# Patient Record
Sex: FEMALE | Race: WHITE | Employment: OTHER | URBAN - METROPOLITAN AREA
[De-identification: names, ages, dates, MRNs, and addresses within clinical notes are randomized per-mention and may not be internally consistent; named-entity substitution may affect disease eponyms.]

---

## 2021-02-24 ENCOUNTER — ANESTHESIA EVENT (OUTPATIENT)
Dept: SURGERY | Age: 61
End: 2021-02-24
Payer: COMMERCIAL

## 2021-02-25 ENCOUNTER — HOSPITAL ENCOUNTER (OUTPATIENT)
Age: 61
Setting detail: OUTPATIENT SURGERY
Discharge: HOME | End: 2021-02-25
Attending: ORTHOPAEDIC SURGERY | Admitting: ORTHOPAEDIC SURGERY
Payer: COMMERCIAL

## 2021-02-25 ENCOUNTER — ANESTHESIA (OUTPATIENT)
Dept: SURGERY | Age: 61
End: 2021-02-25
Payer: COMMERCIAL

## 2021-02-25 VITALS
DIASTOLIC BLOOD PRESSURE: 82 MMHG | TEMPERATURE: 98.2 F | HEART RATE: 63 BPM | SYSTOLIC BLOOD PRESSURE: 132 MMHG | RESPIRATION RATE: 16 BRPM | WEIGHT: 152 LBS | BODY MASS INDEX: 28.72 KG/M2 | OXYGEN SATURATION: 96 %

## 2021-02-25 PROCEDURE — 76210000063 HC OR PH I REC FIRST 0.5 HR: Performed by: ORTHOPAEDIC SURGERY

## 2021-02-25 PROCEDURE — 76210000020 HC REC RM PH II FIRST 0.5 HR: Performed by: ORTHOPAEDIC SURGERY

## 2021-02-25 PROCEDURE — 2709999900 HC NON-CHARGEABLE SUPPLY: Performed by: ORTHOPAEDIC SURGERY

## 2021-02-25 PROCEDURE — 77030002888 HC SUT CHRMC J&J -A: Performed by: ORTHOPAEDIC SURGERY

## 2021-02-25 PROCEDURE — 74011250636 HC RX REV CODE- 250/636: Performed by: ANESTHESIOLOGY

## 2021-02-25 PROCEDURE — 74011250636 HC RX REV CODE- 250/636: Performed by: NURSE ANESTHETIST, CERTIFIED REGISTERED

## 2021-02-25 PROCEDURE — 77030002916 HC SUT ETHLN J&J -A: Performed by: ORTHOPAEDIC SURGERY

## 2021-02-25 PROCEDURE — 76010000154 HC OR TIME FIRST 0.5 HR: Performed by: ORTHOPAEDIC SURGERY

## 2021-02-25 PROCEDURE — 74011000250 HC RX REV CODE- 250: Performed by: NURSE ANESTHETIST, CERTIFIED REGISTERED

## 2021-02-25 PROCEDURE — 74011250636 HC RX REV CODE- 250/636: Performed by: NURSE PRACTITIONER

## 2021-02-25 PROCEDURE — 76060000031 HC ANESTHESIA FIRST 0.5 HR: Performed by: ORTHOPAEDIC SURGERY

## 2021-02-25 PROCEDURE — 77030010507 HC ADH SKN DERMBND J&J -B: Performed by: ORTHOPAEDIC SURGERY

## 2021-02-25 PROCEDURE — 74011000250 HC RX REV CODE- 250: Performed by: ORTHOPAEDIC SURGERY

## 2021-02-25 RX ORDER — MIDAZOLAM HYDROCHLORIDE 1 MG/ML
2 INJECTION, SOLUTION INTRAMUSCULAR; INTRAVENOUS ONCE
Status: DISCONTINUED | OUTPATIENT
Start: 2021-02-25 | End: 2021-02-25 | Stop reason: HOSPADM

## 2021-02-25 RX ORDER — SODIUM CHLORIDE 0.9 % (FLUSH) 0.9 %
5-40 SYRINGE (ML) INJECTION EVERY 8 HOURS
Status: DISCONTINUED | OUTPATIENT
Start: 2021-02-25 | End: 2021-02-25 | Stop reason: HOSPADM

## 2021-02-25 RX ORDER — CEFAZOLIN SODIUM/WATER 2 G/20 ML
2 SYRINGE (ML) INTRAVENOUS ONCE
Status: COMPLETED | OUTPATIENT
Start: 2021-02-25 | End: 2021-02-25

## 2021-02-25 RX ORDER — LIDOCAINE HYDROCHLORIDE 10 MG/ML
INJECTION INFILTRATION; PERINEURAL AS NEEDED
Status: DISCONTINUED | OUTPATIENT
Start: 2021-02-25 | End: 2021-02-25 | Stop reason: HOSPADM

## 2021-02-25 RX ORDER — OXYCODONE HYDROCHLORIDE 5 MG/1
5 TABLET ORAL
Status: DISCONTINUED | OUTPATIENT
Start: 2021-02-25 | End: 2021-02-25 | Stop reason: HOSPADM

## 2021-02-25 RX ORDER — LIDOCAINE HYDROCHLORIDE 10 MG/ML
0.1 INJECTION INFILTRATION; PERINEURAL AS NEEDED
Status: DISCONTINUED | OUTPATIENT
Start: 2021-02-25 | End: 2021-02-25 | Stop reason: HOSPADM

## 2021-02-25 RX ORDER — PROPOFOL 10 MG/ML
INJECTION, EMULSION INTRAVENOUS AS NEEDED
Status: DISCONTINUED | OUTPATIENT
Start: 2021-02-25 | End: 2021-02-25 | Stop reason: HOSPADM

## 2021-02-25 RX ORDER — LIDOCAINE HYDROCHLORIDE 20 MG/ML
INJECTION, SOLUTION EPIDURAL; INFILTRATION; INTRACAUDAL; PERINEURAL AS NEEDED
Status: DISCONTINUED | OUTPATIENT
Start: 2021-02-25 | End: 2021-02-25 | Stop reason: HOSPADM

## 2021-02-25 RX ORDER — FENTANYL CITRATE 50 UG/ML
25 INJECTION, SOLUTION INTRAMUSCULAR; INTRAVENOUS ONCE
Status: DISCONTINUED | OUTPATIENT
Start: 2021-02-25 | End: 2021-02-25 | Stop reason: HOSPADM

## 2021-02-25 RX ORDER — SODIUM CHLORIDE 9 MG/ML
50 INJECTION, SOLUTION INTRAVENOUS CONTINUOUS
Status: DISCONTINUED | OUTPATIENT
Start: 2021-02-25 | End: 2021-02-25 | Stop reason: HOSPADM

## 2021-02-25 RX ORDER — SODIUM CHLORIDE 0.9 % (FLUSH) 0.9 %
5-40 SYRINGE (ML) INJECTION AS NEEDED
Status: DISCONTINUED | OUTPATIENT
Start: 2021-02-25 | End: 2021-02-25 | Stop reason: HOSPADM

## 2021-02-25 RX ORDER — MIDAZOLAM HYDROCHLORIDE 1 MG/ML
2 INJECTION, SOLUTION INTRAMUSCULAR; INTRAVENOUS
Status: DISCONTINUED | OUTPATIENT
Start: 2021-02-25 | End: 2021-02-25 | Stop reason: HOSPADM

## 2021-02-25 RX ORDER — SODIUM CHLORIDE, SODIUM LACTATE, POTASSIUM CHLORIDE, CALCIUM CHLORIDE 600; 310; 30; 20 MG/100ML; MG/100ML; MG/100ML; MG/100ML
100 INJECTION, SOLUTION INTRAVENOUS CONTINUOUS
Status: DISCONTINUED | OUTPATIENT
Start: 2021-02-25 | End: 2021-02-25 | Stop reason: HOSPADM

## 2021-02-25 RX ORDER — PROPOFOL 10 MG/ML
INJECTION, EMULSION INTRAVENOUS
Status: DISCONTINUED | OUTPATIENT
Start: 2021-02-25 | End: 2021-02-25 | Stop reason: HOSPADM

## 2021-02-25 RX ORDER — BUPIVACAINE HYDROCHLORIDE 2.5 MG/ML
INJECTION, SOLUTION EPIDURAL; INFILTRATION; INTRACAUDAL AS NEEDED
Status: DISCONTINUED | OUTPATIENT
Start: 2021-02-25 | End: 2021-02-25 | Stop reason: HOSPADM

## 2021-02-25 RX ORDER — OXYCODONE AND ACETAMINOPHEN 5; 325 MG/1; MG/1
1 TABLET ORAL AS NEEDED
Status: DISCONTINUED | OUTPATIENT
Start: 2021-02-25 | End: 2021-02-25 | Stop reason: HOSPADM

## 2021-02-25 RX ORDER — SODIUM CHLORIDE, SODIUM LACTATE, POTASSIUM CHLORIDE, CALCIUM CHLORIDE 600; 310; 30; 20 MG/100ML; MG/100ML; MG/100ML; MG/100ML
75 INJECTION, SOLUTION INTRAVENOUS CONTINUOUS
Status: DISCONTINUED | OUTPATIENT
Start: 2021-02-25 | End: 2021-02-25 | Stop reason: HOSPADM

## 2021-02-25 RX ORDER — HYDROMORPHONE HYDROCHLORIDE 2 MG/ML
0.5 INJECTION, SOLUTION INTRAMUSCULAR; INTRAVENOUS; SUBCUTANEOUS
Status: DISCONTINUED | OUTPATIENT
Start: 2021-02-25 | End: 2021-02-25 | Stop reason: HOSPADM

## 2021-02-25 RX ORDER — DIPHENHYDRAMINE HYDROCHLORIDE 50 MG/ML
12.5 INJECTION, SOLUTION INTRAMUSCULAR; INTRAVENOUS
Status: DISCONTINUED | OUTPATIENT
Start: 2021-02-25 | End: 2021-02-25 | Stop reason: HOSPADM

## 2021-02-25 RX ORDER — FAMOTIDINE 20 MG/1
20 TABLET, FILM COATED ORAL ONCE
Status: DISCONTINUED | OUTPATIENT
Start: 2021-02-25 | End: 2021-02-25 | Stop reason: HOSPADM

## 2021-02-25 RX ORDER — MIDAZOLAM HYDROCHLORIDE 1 MG/ML
INJECTION, SOLUTION INTRAMUSCULAR; INTRAVENOUS AS NEEDED
Status: DISCONTINUED | OUTPATIENT
Start: 2021-02-25 | End: 2021-02-25 | Stop reason: HOSPADM

## 2021-02-25 RX ADMIN — CEFAZOLIN 2 G: 1 INJECTION, POWDER, FOR SOLUTION INTRAVENOUS at 09:14

## 2021-02-25 RX ADMIN — LIDOCAINE HYDROCHLORIDE 20 MG: 20 INJECTION, SOLUTION EPIDURAL; INFILTRATION; INTRACAUDAL; PERINEURAL at 09:14

## 2021-02-25 RX ADMIN — PROPOFOL 200 MCG/KG/MIN: 10 INJECTION, EMULSION INTRAVENOUS at 09:14

## 2021-02-25 RX ADMIN — MIDAZOLAM 2 MG: 1 INJECTION INTRAMUSCULAR; INTRAVENOUS at 09:13

## 2021-02-25 RX ADMIN — PROPOFOL 40 MG: 10 INJECTION, EMULSION INTRAVENOUS at 09:14

## 2021-02-25 RX ADMIN — SODIUM CHLORIDE, SODIUM LACTATE, POTASSIUM CHLORIDE, AND CALCIUM CHLORIDE 75 ML/HR: 600; 310; 30; 20 INJECTION, SOLUTION INTRAVENOUS at 08:23

## 2021-02-25 NOTE — DISCHARGE INSTRUCTIONS
Postoperative  Instructions:      Weightbearing or Lifting:  Limit  weight  lifting  to  less  than  1  pound  (coffee  mug)  for  the  first  2  weeks  after  surgery. Dressing  instructions:    Keep  your  dressing  and/or  splint  clean  and  dry  at  all  times. You  can  remove  your  dressing  on  post-operative  day  #5  and  change  with  a  dry/sterile  dressing  or  Band-Aids  as  needed  thereafter. Showering  Instructions:  May  shower  But keep surgical dressing clean and dry until removed as explained above. After dressing is removed, you may allow soapy water to run through the incision during showers but do not scrub. After each shower, pat dry and apply a dry dressing. Do  not  soak  your  Incision in still water or bathtub  for  3  weeks  after  surgery. If  the  incision  gets  wet otherwise,  pat  dry  and  do  not  scrub  the  incision. Do  not  apply  cream  or  lotion  to  incision      Pain  Control:  - You  have  been  given  a  prescription  to  be  taken  as  directed  for  post-operative  pain  control. In  addition,  elevate  the  operative  extremity  above  the  heart  at  all  times  to  prevent  swelling  and  throbbing  pain. - If you develop constipation while taking narcotic pain medications (Norco, Hydrocodone, Percocet, Oxycodone, Dilaudid, Hydromorphone) take  over-the-counter  Colace,  100mg  by  mouth  twice  a  Day. - Nausea  is  a  common  side  effect  of  many  pain  medications. You  will  want  to  eat something  before  taking  your  pain  medicine  to  help  prevent  Nausea. - If  you  are  taking  a  prescription  pain  medication  that  contains  acetaminophen,  we  recommend  that  you  do  not  take  additional  over  the  counter  acetaminophen  (Tylenol®).       Other  pain  relieving  options:   - Using  a  cold  pack  to  ice  the  affected  area  a  few  times  a  day  (15  to  20  minutes  at  a  time)  can  help  to  relieve pain,  reduce  swelling  and  bruising.      - Elevation  of  the  affected  area  can  also  help  to  reduce  pain  and  swelling. Did  you  receive  a  nerve  Block? A  nerve  block  can  provide  pain  relief  for  one  hour  to  two  days  after  your  surgery. As  long  as  the  nerve  block  is  working,  you  will  experience  little  or  no  sensation  in  the  area  the  surgeon  operated  on. As  the  nerve  block  wears  off,  you  will  begin  to  experience  pain  or  discomfort. It  is  very  important  that  you  begin  taking  your  prescribed  pain  medication  before  the  nerve  block  fully  wears  off. The first sign that the nerve block is wearing off is tingling in your fingers. Treating  your  pain  at  the  first  sign  of  the  block  wearing  off  will  ensure  your  pain  is  better  controlled  and  more  tolerable  when  full-sensation  returns. Do  not  wait  until  the  pain  is  intolerable,  as  the  medicine  will  be  less  effective. It  is  better  to  treat  pain  in  advance  than  to  try  and  catch  up. General  Anesthesia or Sedation:      If  you  did  not  receive  a  nerve  block  during  your  surgery,  you  will  need  to  start  taking  your  pain  medication  shortly  after  your  surgery  and  should  continue  to  do  so  as  prescribed  by  your  surgeon. Please  call  [phone]  with any concern and ask to speak with Chelsea Hwang. Concerning problems include:      -  Excessive  redness  of  the  incisions      -  Drainage  for  more  than  2  Days after surgery or any foul smelling drainage  -  Fever  of  more  than  101.5  F      Please  call  [phone]  if  you  do  not  receive  or  are  unsure  of  your  first  follow-up  appointment. You  should  see  the  doctor  10-14  days  after  your  Surgery. Thank you for choosing me and [address] [place] [address] for your care.  I will go above and beyond to ensure you receive the best care possible. Brenda Chan MD, PhD    ACTIVITY  · As tolerated and as directed by your doctor. · Bathe or shower as directed by your doctor. DIET  · Clear liquids until no nausea or vomiting; then light diet for the first day. · Advance to regular diet on second day, unless your doctor orders otherwise. · If nausea and vomiting continues, call your doctor. PAIN  · Take pain medication as directed by your doctor. · Call your doctor if pain is NOT relieved by medication. · DO NOT take aspirin of blood thinners unless directed by your doctor. CALL YOUR DOCTOR IF   · Excessive bleeding that does not stop after holding pressure over the area  · Temperature of 101 degrees F or above  · Excessive redness, swelling or bruising, and/ or green or yellow, smelly discharge from incision    AFTER ANESTHESIA   · For the first 24 hours: DO NOT Drive, Drink alcoholic beverages, or Make important decisions. · Be aware of dizziness following anesthesia and while taking pain medication. DISCHARGE SUMMARY from Nurse    PATIENT INSTRUCTIONS:    After general anesthesia or intravenous sedation, for 24 hours or while taking prescription Narcotics:  · Limit your activities  · Do not drive and operate hazardous machinery  · Do not make important personal or business decisions  · Do  not drink alcoholic beverages  · If you have not urinated within 8 hours after discharge, please contact your surgeon on call. *  Please give a list of your current medications to your Primary Care Provider. *  Please update this list whenever your medications are discontinued, doses are      changed, or new medications (including over-the-counter products) are added. *  Please carry medication information at all times in case of emergency situations.       These are general instructions for a healthy lifestyle:    No smoking/ No tobacco products/ Avoid exposure to second hand smoke    Surgeon General's Warning:  Quitting smoking now greatly reduces serious risk to your health. Obesity, smoking, and sedentary lifestyle greatly increases your risk for illness    A healthy diet, regular physical exercise & weight monitoring are important for maintaining a healthy lifestyle    You may be retaining fluid if you have a history of heart failure or if you experience any of the following symptoms:  Weight gain of 3 pounds or more overnight or 5 pounds in a week, increased swelling in our hands or feet or shortness of breath while lying flat in bed. Please call your doctor as soon as you notice any of these symptoms; do not wait until your next office visit. Recognize signs and symptoms of STROKE:    F-face looks uneven    A-arms unable to move or move unevenly    S-speech slurred or non-existent    T-time-call 911 as soon as signs and symptoms begin-DO NOT go       Back to bed or wait to see if you get better-TIME IS BRAIN. Learning About COVID-19 and Social Distancing  What is it? Social distancing means putting space between yourself and other people. The recommended distance is 6 feet, or about 2 meters. This also means staying away from any place where people may gather, such as khanna or other public gathering places. Why is it important? Social distancing is the best way to reduce the spread of COVID-19. This virus seems to spread from person to person through droplets from coughing and sneezing. So if you keep your distance from others, you're less likely to get it or spread it. And social distancing is important for everyone, not just those who are at high risk of infection, like older people. You might have the virus but not have symptoms. You could then give the infection to someone you come into contact with. How is it done? Putting 6 feet, or about 2 meters, between you and other people is the recommended distance.  Also stay away from any place where people may gather, such as khanna or other public gathering places. So if possible:  · Work from home, and keep your kids at home.  · Don't travel if you don't have to. And avoid public transportation, ride-shares, and taxis unless you have no choice.  · Limit shopping to essentials, like food and medicines.  · Wear a cloth face cover if you have to go to a public place like the grocery store or pharmacy.  · Don't eat in restaurants. (You can still get takeout or food deliveries.)  · Avoid crowds and busy places. Follow stay-at-home orders or other directions for your area.  Where can you learn more?  Go to https://www.Myfacepage.net/[employer]  Enter A133 in the search box to learn more about \"Learning About COVID-19 and Social Distancing.\"  Current as of: December 18, 2020               Content Version: 12.7  © 1796-6341 [employer].   Care instructions adapted under license by [employer] (which disclaims liability or warranty for this information). If you have questions about a medical condition or this instruction, always ask your healthcare professional. [employer] disclaims any warranty or liability for your use of this information.

## 2021-02-25 NOTE — ANESTHESIA PREPROCEDURE EVALUATION
Relevant Problems   No relevant active problems       Anesthetic History   No history of anesthetic complications            Review of Systems / Medical History  Patient summary reviewed and pertinent labs reviewed    Pulmonary  Within defined limits              Comments: D/Cali smoking 3 years ago   Neuro/Psych             Comments: Hx of panic attacks Cardiovascular    Hypertension          Hyperlipidemia    Exercise tolerance: >4 METS     GI/Hepatic/Renal     GERD: well controlled           Endo/Other        Morbid obesity     Other Findings            Physical Exam    Airway  Mallampati: II  TM Distance: > 6 cm  Neck ROM: normal range of motion   Mouth opening: Normal     Cardiovascular    Rhythm: regular           Dental    Dentition: Caps/crowns     Pulmonary                 Abdominal  GI exam deferred       Other Findings            Anesthetic Plan    ASA: 3  Anesthesia type: total IV anesthesia          Induction: Intravenous  Anesthetic plan and risks discussed with: Patient

## 2021-02-25 NOTE — ANESTHESIA POSTPROCEDURE EVALUATION
Procedure(s):  RIGHT THUMB TRIGGER RELEASE.    total IV anesthesia    Anesthesia Post Evaluation      Multimodal analgesia: multimodal analgesia not used between 6 hours prior to anesthesia start to PACU discharge  Patient location during evaluation: PACU  Patient participation: complete - patient participated  Level of consciousness: awake  Pain management: adequate  Airway patency: patent  Anesthetic complications: no  Cardiovascular status: acceptable  Respiratory status: acceptable  Hydration status: acceptable  Post anesthesia nausea and vomiting:  none  Final Post Anesthesia Temperature Assessment:  Normothermia (36.0-37.5 degrees C)      INITIAL Post-op Vital signs:   Vitals Value Taken Time   /81 02/25/21 0953   Temp 36.8 °C (98.2 °F) 02/25/21 0939   Pulse 60 02/25/21 1001   Resp 16 02/25/21 0943   SpO2 94 % 02/25/21 1001   Vitals shown include unvalidated device data.

## 2021-02-25 NOTE — OP NOTES
Hand Surgery Operative Note      Venice Busby   56 y.o.   female      Pre-op diagnosis: Right thumb Trigger Finger   Post op diagnosis: same      Procedure: Right  thumb Trigger Finger Release, (CPT 91823)     Surgeon: Reynold Gallegos MD, PhD      Anesthesia: [employer] Block     Tourniquet time:   Total Tourniquet Time Documented:  Forearm (Right) - 9 minutes  Total: Forearm (Right) - 9 minutes        Procedure indications: Patient with catching and locking of the above mentioned finger(s) which have been recalcitrant to nonoperative measures. After Thorough discussion, the patient decided to proceed with surgical management. We discussed in detail surgical risks including scar, pain, bleeding, infection, anesthetic risks, neurovascular injury, need for further surgery,  weakness, stiffness, risk of death and potential risk of other unforseen complication. Procedure description:      Patient was placed in the supine position and after appropriate time-out and side, site and procedure confirmed. The anesthesia team provided a [employer] Block. A longitudinal incision was made along the thumb MP crease under loupe magnification. Blunt dissection was used to identify the A1 pulley as well as the neurovascular bundle on each side of the flexor tendon. Blunt retraction was used to protect the neurovascular bundles. Sharp incision was made on top of the A1 pulley and scissor dissection was used to extend the sheath incision proximally to release the palmar pulley and distally until the A2 pulley was encountered. The flexor tendons were then mobilized and not catching or clicking was identified. Wound was irrigated and hemostasis was obtained with bipolar cautery. Skin edges were infiltrated with . 25% Bupivacaine. Wound then closed with 4-0 rapide and sterile dressing applied. Disposition: To PACU with no complications and follow up per routine.   Patient is instructed to remove dressings in five days and other precautions include avoidance of heavy and repetitive lifting for 2 weeks, when an appointment for follow up and suture removal will take place.      Tommy Desai MD  02/25/21  9:32 AM

## 2021-02-25 NOTE — H&P
H&P Update:  Sheryl Rodriguez was seen and examined. History and physical has been reviewed. The patient has been examined.  There have been no significant clinical changes since the completion of the originally dated History and Nicki Ribeiro MD, PhD  Orthopaedic Surgery  02/25/21  8:17 AM

## 2021-05-12 ENCOUNTER — HOSPITAL ENCOUNTER (OUTPATIENT)
Dept: PHYSICAL THERAPY | Age: 61
Discharge: HOME | End: 2021-05-12
Payer: COMMERCIAL

## 2021-05-12 PROCEDURE — 97162 PT EVAL MOD COMPLEX 30 MIN: CPT

## 2021-05-12 PROCEDURE — 97110 THERAPEUTIC EXERCISES: CPT

## 2021-05-12 NOTE — THERAPY EVALUATION
Kelley Faust  : 1960  Primary: [facility]*  Secondary:  [facility] [place] [address], [address], [place]  [phone]   [phone]        OUTPATIENT PHYSICAL THERAPY:Initial Assessment 2021   ICD-10: Treatment Diagnosis: Lack of coordination (muscle incoordination) (R27.8), Pelvic floor dysfunction in female (M62.98), Stress incontinence (female) (male) (N39.3), Low back pain (M54.5 ), Pelvic and perineal pain (R10.2) and Myalgia (M79.1)  Precautions/Allergies:   Axid [nizatidine], Other medication, Simcor [niacin-simvastatin], Sulfa (sulfonamide antibiotics), Crestor [rosuvastatin], Erythromycin, and Shellfish containing products  TREATMENT PLAN:  Effective Dates: 2021 TO 8/10/2021 (90 days). Frequency/Duration: 1-2 time a week for 90 Day(s) MEDICAL/REFERRING DIAGNOSIS:  PFD (pelvic floor dysfunction) [M62.89]  DATE OF ONSET: chronic  REFERRING PHYSICIAN: Justine Torre MD MD Orders: Evaluate and treat  Return MD Appointment: 21     INITIAL ASSESSMENT: Kelley Faust presents with musculoskeletal limitations including pelvic floor muscle (PFM) tension, reduced PFM Range of Motion (ROM), increased tenderness to palpation of the PFM, reduced hip ROM, reduced coordination and awareness of PFM, reduced hip strength, poor diaphragm excursion and poor abdominal strength. These limitations are impairing the patient's ability to properly coordinate perineal elevation and descent for normalized PFM function, contributing to sexual dysfunction and low back pain. As a result, she is limited in her/his ability to participate in gynecological exams, intercourse and house hold tasks. PROBLEM LIST (Impacting functional limitations):  1. Decreased Strength  2. Decreased ADL/Functional Activities  3. Increased Pain  4. Decreased Activity Tolerance  5. Decreased Flexibility/Joint Mobility  6.  Decreased Skin Integrity/Hygeine  7. Decreased [place] with Home Exercise Program  8. Decreased timing coordination and awareness INTERVENTIONS PLANNED: (Treatment may consist of any combination of the following)  1. Neuromuscular re-education  2. Biofeedback as needed  3. HEP  4. Bladder retraining  5. Bladder education  6. Home Exercise Program (HEP)  7. Manual Therapy  8. Neuromuscular Re-education/Strengthening  9. Therapeutic Activites  10. Therapeutic Exercise/Strengthening     GOALS: (Goals have been discussed and agreed upon with patient.)  Short-Term Functional Goals: Time Frame: 6 weeks  1. Pt will demonstrate I with basic PFM HEP to improve awareness, coordination, and timing of PFM. 2. Pt will demonstrate ability to perform diaphragmatic breathing in multiple positions to assist in pelvic floor tension reduction. 3. Pt will verbalize understanding and demonstrate postural adjustments which facilitate lengthening and reduce overall pelvic floor muscle activity. Discharge Goals: Time Frame: 12 weeks  1. Pt will demonstrate ability to voluntarily contract the pelvic floor muscles prior to a cough or sneeze for decreased leakage during increases in intra-abdominal pressure. 2. Pt will improve outcome score by 50%. 3. Pt will be independent in a home dilator and/or graded exposure program, to be performed daily, in order to reduce pain and improved tolerance of tampon use, gynecological screening, and/or sexual intercourse. 4. Pt will demonstrate independence with a home exercise program for aerobic conditioning, core stabilization, and general mobility for independent management of symptoms.      OUTCOME MEASURE:   Tool Used: Pelvic Floor Impact Questionnaire--short form 7 (PFIQ-7)   Score:  Initial: 5/13/21  · Bladder or Urine: 71.43/100  · Bowel or Rectum: 71.43/100  · Vagina or Pelvis: 80.95/100 Most Recent: X (Date: -- )  · Bladder or Urine: X  · Bowel or Rectum: X  · Vagina or Pelvis: X   Interpretation of Score: Each of the 7 sections is scored on a scale from 0-3; 0 representing \"Not at all\", 3 representing \"Quite a bit\". The mean value is taken from all the answered items, then multiplied by 100 to obtain the scale score, ranging from 0-100. This process is repeated for each column representing bowel, bladder, and pelvic pain. MEDICAL NECESSITY:   · Patient is expected to demonstrate progress in strength, range of motion, coordination and functional technique to decrease pain and improve function. REASON FOR SERVICES/OTHER COMMENTS:  · Patient continues to require skilled intervention due to above mentioned deficits. Total Duration:  PT Patient Time In/Time Out  Time In: 0936  Time Out: 1035    Rehabilitation Potential For Stated Goals: Good  Regarding Imagene Vargas therapy, I certify that the treatment plan above will be carried out by a therapist or under their direction. Thank you for this referral,  Ann Boo, PT, DPT     Referring Physician Signature: Son Teixeira MD No Signature is Required for this note. PAIN/SUBJECTIVE:   Initial: Pain Intensity 1: 0  Post Session:  1/10   HISTORY:   History of Injury/Illness (Reason for Referral):  Ms. Serg Wilhelm is a 55 yo female referred to pelvic floor physical therapy (PFPT) by Son Teixeira MD 2/2 pain with intercourse. Pt reports that symptoms began about >5 years ago. Over the last 8 years she has tried various medications including vagifem, estradiol and intrarosa/imvexxy. Currently she is on Imvexxy, but is running out. She is having difficulty getting medication covered through insurance and rx sent to company. Pain has become progressive worse over the last few years. Previous treatment includes medications. Urinary: Frequency 5-8 x/day, 0-1 x/night. Positive for stress urinary incontinence. ROBBY w/ coughing and laughing really hard.  Negative for urge urinary incontinence, urinary urgency, urinary frequency, incomplete emptying, urinary hesitancy/intermittency, dysuria, hematuria, nocturia and nocturnal enuresis. Pt does not use pads for urinary protection; 0 pads per day (PPD). Fluid intake: 32 water/day; bladder irritants include: 2c hot tea, 3-4 alcohol beverages/week. Pt does not limit fluid intake due to bladder control. Bowel: Frequency daily to ever few days. Positive for pushing/straining with bowel movement about 50% of the time. Negative for pain with bowel movement, fecal incontinence, constipation and incomplete emptying. Pt does not use pads for bowel protection; 0 pads per day (PPD). [place] stool type: 4. Use of stool softeners or laxatives? NO    Sexual: Pt is sexually active with male partners. Positive for dyspareunia. Pain is superficial and deep. Superficial pain is new and most recent. States that pain is not starting to hurt her  because \"things are so tight\". Contraception/birth control: post menopausal. She does use a lubricant, she has not tried a vaginal moisturizer. Uses Imvexxy 2x/week. Max: 10/10, Min: 8/10    Pelvic Organ Prolapse/Pelvic Pain: Location: chronic LBP, L4-5 herniated discs. Worse with yard work, heavy household work. Relieved with flexeril, gabapentin, rest, Voltaren. Max pain 6/10. Min pain 2/10. Had diskectomy 3 years ago. OB History: Number of pregnancies: 3 Number of vaginal deliveries: 2 Number of c-sections: 0. Patient stated goals for PT:  Patients goal(s) for PT are decrease pain with intercourse. Past Medical History/Comorbidities:   Ms. Yusef Timmnos  has a past medical history of GERD (gastroesophageal reflux disease), Hypercholesteremia, Hypertension, and Panic attacks. She also has no past medical history of Difficult intubation, Malignant hyperthermia due to anesthesia, Nausea & vomiting, Pseudocholinesterase deficiency, or Unspecified adverse effect of anesthesia.   Ms. Yusef Timmons  has a past surgical history that includes hx orthopaedic; hx orthopaedic; hx orthopaedic; hx knee arthroscopy; and hx tonsillectomy (childhood). Social History/Living Environment:   Have you ever had any pelvic trauma (orthopedic in nature, fall, MVA, etc.)? NO   Have you ever experienced any unwanted physical or sexual contact? NO   Have you ever experienced any form of medical trauma (GYN, urological, GI, etc)? NO     Pt lives with her . Alcohol use? Yes, 3-4 drinks/weeks  Tobacco use? No    Prior Level of Function/Work/Activity:  Prior level of function: Independent  Occupation: Retired  Exercise activities: None currently      Risk Factors:       No Risk Factors Identified Ability to Rise from Chair:       (0)  Ability to rise in a single movement   Falls Prevention Plan:       No modifications necessary   Total: (5 or greater = High Risk): 0   ©2010 [facility] [address] [place] [address] States Patent #[id number]. Federal Law prohibits the replication, distribution or use without written permission from [facility] of [employer]   Current Medications:       Current Outpatient Medications:     gabapentin (NEURONTIN) 100 mg capsule, Take  by mouth as needed for Pain., Disp: , Rfl:     cyclobenzaprine (FLEXERIL) 10 mg tablet, Take  by mouth three (3) times daily as needed for Muscle Spasm(s). , Disp: , Rfl:     magnesium 250 mg tab, Take  by mouth daily. , Disp: , Rfl:     ascorbic acid, vitamin C, (Vitamin C) 250 mg tablet, Take  by mouth daily. , Disp: , Rfl:     ergocalciferol (Vitamin D2) 1,250 mcg (50,000 unit) capsule, Take 50,000 Units by mouth daily. , Disp: , Rfl:    - Calcium 2000mcg/day  - Imvexxy  - Voltaren gel  - Valium suppository    Date Last Reviewed: 5/12/2021   Number of Personal Factors/Comorbidities that affect the Plan of Care: 1-2: MODERATE COMPLEXITY   EXAMINATION:   External Observations:   Voluntary contraction: [] absent     [x] present   Involuntary contraction: [] absent     [x] present   Involuntary relaxation: [] absent     [x] present   Perineal descent at rest: [x] absent     [] present   Perineal descent with bearing: [] absent     [x] present    Pelvic Floor Muscle (PFM) Assessment:   Vaginal vault size: [] decreased     [] increased     [x] WNL   Muscle volume: [] decreased     [x] WNL     [] Defect   PFM tension: [] decreased     [x] increased     [] WNL    Contraction ability:  Voluntary contraction: [] absent     [x] weak     [] moderate      [] strong  Voluntary relaxation: [] absent     [x] partial     [] complete   MMT: 2/5   PFM endurance: DNT  Repetitions of endurance contractions: DNT  Number of quick contractions in 10 seconds: DNT  Quality of contractions: Fair with delayed, incomplete, rebounding relaxation    Tissue laxity test:  Anterior wall: [] minimum     [] moderate     [] severe      [x] WNL  Posterior wall: [] minimum     [] moderate     [] severe      [x] WNL    Palpation: via vaginal canal ; significant tenderness at midline introitus, layer 1, burning  Superficial Pelvic Floor Musculature (PFM): Tender? Intermediate PFM Tender? Deep PFM Tender? Superficial Transverse Perineal [x] Right  [x] Left  []DNT Deep Transverse Perineal [] Right  [x] Left  []DNT Puborectalis [x] Right  [] Left  []DNT   Ischiocavernosus [x] Right  [x] Left  []DNT Compressor Urethra [] Right  [] Left  []DNT Pubococcygeus [x] Right  [] Left  []DNT   Bulbocavernosus [] Right  [] Left  []DNT   Ileococcygeus [] Right  [x] Left  []DNT       Obturator Internus [] Right  [] Left  [x]DNT       Coccygeus [] Right  [] Left  [x]DNT        Body Structures Involved:  1. Nerves  2. Bones  3. Joints  4. Muscles  5. Ligaments Body Functions Affected:  1. Sensory/Pain  2. Genitourinary  3. Neuromusculoskeletal  4. Movement Related  5. Skin Related Activities and Participation Affected:  1. Learning and Applying Knowledge  2. General Tasks and Demands  3. Mobility  4. Self Care  5. Domestic Life  6.  Interpersonal Interactions and Relationships  7.  Community, Social and [place]   Number of elements (examined above) that affect the Plan of Care: 3: MODERATE COMPLEXITY   CLINICAL PRESENTATION:   Presentation: Evolving clinical presentation with changing clinical characteristics: MODERATE COMPLEXITY   CLINICAL DECISION MAKING:   Use of outcome tool(s) and clinical judgement create a POC that gives a: Questionable prediction of patient's progress: MODERATE COMPLEXITY

## 2021-05-13 NOTE — PROGRESS NOTES
Marta Jones  : 1960  Primary: [facility]*  Secondary:  [facility] [address], [place] [address]  [phone]   [phone]      OUTPATIENT PHYSICAL THERAPY: Daily Treatment Note 2021   Visit Count:  1  ICD-10: Treatment Diagnosis: Lack of coordination (muscle incoordination) (R27.8), Pelvic floor dysfunction in female (M62.98), Stress incontinence (female) (male) (N39.3), Low back pain (M54.5 ), Pelvic and perineal pain (R10.2) and Myalgia (M79.1)  Precautions/Allergies:   Axid [nizatidine], Other medication, Simcor [niacin-simvastatin], Sulfa (sulfonamide antibiotics), Crestor [rosuvastatin], Erythromycin, and Shellfish containing products  TREATMENT PLAN:  Effective Dates: 2021 TO 8/10/2021 (90 days). Frequency/Duration: 1-2 time a week for 90 Day(s) MEDICAL/REFERRING DIAGNOSIS:  PFD (pelvic floor dysfunction) [M62.89]  DATE OF ONSET: chronic  REFERRING PHYSICIAN: Jackeline Polanco MD MD Orders: Evaluate and treat  Return MD Appointment: 21     Pre-treatment Symptoms/Complaints:  Pelvic pain with intercourse, ROBBY  Pain: Initial: Pain Intensity 1: 0  Post Session:  1/10   Medications Last Reviewed:  2021  Updated Objective Findings:  See evaluation note from today   TREATMENT:   THERAPEUTIC EXERCISE: (10 minutes):  Exercises per grid below to improve mobility and coordination. Required moderate visual, verbal and tactile cues to promote proper body breathing techniques and PFM relaxation. Progressed range and repetitions as indicated.   TE= therapeutic exercise, TA= therapeutic activity, MT= manual therapy, NE= neuro re-education   Date:  21 Date:   Date:     Activity/Exercise Parameters Parameters Parameters   PFM coordination PFM drops to improve relaxation, ROM and pain     Diaphragmatic breathing To improve PFM ROM and excursion                                     THERAPEUTIC ACTIVITY: ( 10 minutes): Instruction/education on PFM role/function in relation to patient symptoms. Patient Q&A. TA Educational Topic Notes Date Completed   Pathology/Anatomy/PFM Function  5/13/21   Bladder health education     Urinary urge suppression     The knack     Voiding strategies     Bowel/Bladder log     Bowel health education     Constipation care     Diarrhea/Fecal leakage     Colonic massage     Toilet positioning     Defecation dynamics     Sources of fiber     Return to intercourse/Dilator training     Sexual positioning     Lubricants/vaginal moisturizers     Vaginal irritants     Body mechanics     Posture/ergonomics     Diaphragmatic breathing     Resources and technology       Pt gives verbal consent to internal vaginal assessment/treatment without chaperon present. Treatment/Session Summary:    · Response to Treatment:  Pt reports good understanding of plan of care, as well as prescribed home exercise program.  All questions were answered to pt's satisfaction. Pt was invited to call with any further questions or concerns. · Communication/Consultation:  None today  · Equipment provided today:  None today  · Recommendations/Intent for next treatment session: Next visit will focus on down training, manual therapy as tolerated, flexibility.   Total Treatment Billable Duration: Evaluation 35 minutes, treatment 20 minutes  PT Patient Time In/Time Out  Time In: 9923  Time Out: [place] 5 Nix, PT, DPT     Future Appointments   Date Time Provider Bridget Venegas   5/20/2021  1:00 PM Bobbi Glover PT, DPT [facility]   5/26/2021  9:00 AM Nishant Delgado PT, DPT [facility]   6/9/2021  9:00 AM Bobbi Glover PT, DPT [facility]   6/11/2021 12:30 PM [facility]   6/16/2021  4:00 PM Bobbi Glover PT, DPT [facility]   6/28/2021 11:00 AM Bobbi Glover PT, DPT [facility]   7/14/2021 11:00 AM Bobbi Glover PT, DPT [facility]   7/21/2021 11:00 AM Nix, Derek Meadows, PT, [facility]   7/28/2021 11:00 AM Derek Delgado PT[facility]

## 2021-05-20 ENCOUNTER — HOSPITAL ENCOUNTER (OUTPATIENT)
Dept: PHYSICAL THERAPY | Age: 61
Discharge: HOME | End: 2021-05-20
Payer: COMMERCIAL

## 2021-05-20 PROCEDURE — 97140 MANUAL THERAPY 1/> REGIONS: CPT

## 2021-05-20 PROCEDURE — 97110 THERAPEUTIC EXERCISES: CPT

## 2021-05-20 NOTE — PROGRESS NOTES
Sarah Morales  : 1960  Primary: [facility]*  Secondary:  [facility]  [address] , [address], [place] [address]  [phone]   [phone]      OUTPATIENT PHYSICAL THERAPY: Daily Treatment Note 2021   Visit Count:  2  ICD-10: Treatment Diagnosis: Lack of coordination (muscle incoordination) (R27.8), Pelvic floor dysfunction in female (M62.98), Stress incontinence (female) (male) (N39.3), Low back pain (M54.5 ), Pelvic and perineal pain (R10.2) and Myalgia (M79.1)  Precautions/Allergies:   Axid [nizatidine], Other medication, Simcor [niacin-simvastatin], Sulfa (sulfonamide antibiotics), Crestor [rosuvastatin], Erythromycin, and Shellfish containing products  TREATMENT PLAN:  Effective Dates: 2021 TO 8/10/2021 (90 days). Frequency/Duration: 1-2 time a week for 90 Day(s) MEDICAL/REFERRING DIAGNOSIS:  PFD (pelvic floor dysfunction) [M62.89]  DATE OF ONSET: chronic  REFERRING PHYSICIAN: Kaela Laurent MD MD Orders: Evaluate and treat  Return MD Appointment: 21     Pre-treatment Symptoms/Complaints:  Pelvic pain with intercourse, ROBBY    Has been practicing exercises and was doing well. Is feeling relaxation but this is challenging. Breathing has been more challenging the last few day due to stuffy nose. She forgot to contact MD office to follow up about prescription. Pt reports \"shards\" in sciatic nerve; she has constant numbness down her R LE  Pain: Initial: Pain Intensity 1: 0  Post Session:  0/10   Medications Last Reviewed:  2021  Updated Objective Findings:  extreme sensitivity with pudendal nerve glide- inferior rectal branch and perineal branches; negative slump test   TREATMENT:   THERAPEUTIC EXERCISE: (25 minutes):  Exercises per grid below to improve mobility and coordination. Required moderate visual, verbal and tactile cues to promote proper body breathing techniques and relaxation time.   Progressed range and repetitions as indicated. TE= therapeutic exercise, TA= therapeutic activity, MT= manual therapy, NE= neuro re-education   Date:  5/12/21 Date:  5/20/21 Date:     Activity/Exercise Parameters Parameters Parameters   PFM coordination PFM drops to improve relaxation, ROM and pain Drops w/ sEMG    Diaphragmatic breathing To improve PFM ROM and excursion     Adductor stretch  3x30s    Happy baby stretch  3x30s    Piriformis strech  3x30s, cross body    HEP  See below          Access Code: [email or web address]  URL: [email or web address]  Date: 05/20/2021  Prepared by: Virgen Rosa    Exercises  Supine Butterfly Groin Stretch - 2 x daily - 7 x weekly - 3 reps - 30 hold  Supine Pelvic Floor Stretch - Hands on Knees - 2 x daily - 7 x weekly - 3 reps - 30 hold  Supine Piriformis Stretch with Foot on Ground - 2 x daily - 7 x weekly - 3 reps - 30 hold  PFM drops [id number]  Diaphragmatic breathing [id number]    THERAPEUTIC ACTIVITY: ( 0 minutes): Instruction/education on PFM role/function in relation to patient symptoms. Patient Q&A. TA Educational Topic Notes Date Completed   Pathology/Anatomy/PFM Function  5/13/21   Bladder health education     Urinary urge suppression     The knack     Voiding strategies     Bowel/Bladder log     Bowel health education     Constipation care     Diarrhea/Fecal leakage     Colonic massage     Toilet positioning     Defecation dynamics     Sources of fiber     Return to intercourse/Dilator training     Sexual positioning     Lubricants/vaginal moisturizers     Vaginal irritants     Body mechanics     Posture/ergonomics     Diaphragmatic breathing     Resources and technology       MANUAL THERAPY: (30 minutes): Soft tissue mobilization was utilized and necessary because of the patient's painful spasm and restricted motion of soft tissue.     Date Type Location Comments   5/20/2021 Internal assessment/treatment Via vaginal canal Single digit MT to all PFM    STM adductor Skin rolling into ischiorectal fossa; pudendal nerve glide- inferior rectal and perineal branches                                 (Used abbreviations: MET - muscle energy technique; SCS- Strain counter strain; CTM-Connective tissue mobilizations; CR- Contract/Relax; SP- Sustained pressure; PIT- Positional inhibition techniques; STM Soft -tissue mobilization; MM- Myofascial mobilization; TrP-Trigger point release; IASTM- Instrument assisted soft tissue mobilizations, TDN-Trigger point dry needling)    Pt gives verbal consent to internal vaginal assessment/treatment without chaperon present. Treatment/Session Summary:    · Response to Treatment:  Pt with significant tenderness and burning at introitus. Mild improved with SCS at [facility] and DTP, however unchanged with palpation at midline. There is significant tenderness and burning with pudendal nerve glide along inferior rectal branch and perineal branch. Pt was instructed in above stretches in order to improve flexiblilty and LE tightness, likely contributing to increased PFM tension. Will continue to progress manual therapy and down training as tolerated in order to reduce pain and improve function. · Communication/Consultation:  None today  · Equipment provided today:  None today  · Recommendations/Intent for next treatment session: Next visit will focus on down training, manual therapy as tolerated, flexibility.   Total Treatment Billable Duration: 30 minutes MT, 25 minutes  PT Patient Time In/Time Out  Time In: 1300  Time Out: Carmen Amador 1620, PT, DPT     Future Appointments   Date Time Provider Bridget Venegas   5/26/2021  9:00 AM Sangita Delgado PT, DPT [facility]   6/9/2021  9:00 AM Manny Donovan PT, DPT [facility]   6/11/2021 12:30 PM Derek Hastings MD [facility]   6/16/2021  4:00 PM Manny Donovan PT, DPT [facility]   6/28/2021 11:00 AM Manny Donovan PT, [facility]   7/14/2021 11:00 AM Sangita Delgado PT, DPT [facility] 7/21/2021 11:00 AM Herman Delgado PT, DPT [facility]   7/28/2021 11:00 AM Herman Delgado PT, DPT [facility]

## 2021-05-26 ENCOUNTER — HOSPITAL ENCOUNTER (OUTPATIENT)
Dept: PHYSICAL THERAPY | Age: 61
Discharge: HOME | End: 2021-05-26
Payer: COMMERCIAL

## 2021-05-26 PROCEDURE — 97140 MANUAL THERAPY 1/> REGIONS: CPT

## 2021-05-26 PROCEDURE — 97110 THERAPEUTIC EXERCISES: CPT

## 2021-05-26 NOTE — PROGRESS NOTES
Alma Dong  : 1960  Primary: [facility]*  Secondary:  [facility]  [address] , [address], [place] [address]  [phone]   [phone]      OUTPATIENT PHYSICAL THERAPY: Daily Treatment Note 2021   Visit Count:  3  ICD-10: Treatment Diagnosis: Lack of coordination (muscle incoordination) (R27.8), Pelvic floor dysfunction in female (M62.98), Stress incontinence (female) (male) (N39.3), Low back pain (M54.5 ), Pelvic and perineal pain (R10.2) and Myalgia (M79.1)  Precautions/Allergies:   Axid [nizatidine], Other medication, Simcor [niacin-simvastatin], Sulfa (sulfonamide antibiotics), Crestor [rosuvastatin], Erythromycin, and Shellfish containing products  TREATMENT PLAN:  Effective Dates: 2021 TO 8/10/2021 (90 days). Frequency/Duration: 1-2 time a week for 90 Day(s) MEDICAL/REFERRING DIAGNOSIS:  PFD (pelvic floor dysfunction) [M62.89]  DATE OF ONSET: chronic  REFERRING PHYSICIAN: Roger Avalos MD MD Orders: Evaluate and treat  Return MD Appointment: 21     Pre-treatment Symptoms/Complaints:  Pelvic pain with intercourse, ROBBY    Having more point tenderness on R external labia. Does not feel increased pain with stretching. Pain: Initial: Pain Intensity 1: 4  Pain Location 1: Vagina  Post Session:  2/10   Medications Last Reviewed:  2021  Updated Objective Findings:  None Today   TREATMENT:   THERAPEUTIC EXERCISE: (25 minutes):  Exercises per grid below to improve mobility and coordination. Required moderate visual, verbal and tactile cues to promote proper body breathing techniques and relaxation time. Progressed range and repetitions as indicated.   TE= therapeutic exercise, TA= therapeutic activity, MT= manual therapy, NE= neuro re-education   Date:  21 Date:  21 Date:  21   Activity/Exercise Parameters Parameters Parameters   PFM coordination PFM drops to improve relaxation, ROM and pain Drops w/ sEMG Drops with digital palpation   Diaphragmatic breathing To improve PFM ROM and excursion     Adductor stretch  3x30s reviewed   Happy baby stretch  3x30s reviewed   Piriformis strech  3x30s, cross body reviewed   HEP  See below See below  Superficial self stretch   Cat/cow    x10   Mukund Melissa pose   x30s   Sciatic nerve glide   x10         Access Code: [email or web address]  URL: [email or web address]  Date: 05/20/2021  Prepared by: Jessie [place]    Exercises  Supine Butterfly Groin Stretch - 2 x daily - 7 x weekly - 3 reps - 30 hold  Supine Pelvic Floor Stretch - Hands on Knees - 2 x daily - 7 x weekly - 3 reps - 30 hold  Supine Piriformis Stretch with Foot on Ground - 2 x daily - 7 x weekly - 3 reps - 30 hold  PFM drops [id number]  Diaphragmatic breathing [id number]  Seated Slump Nerve Glide - 1 x daily - 7 x weekly - 10 reps  Cat-Camel to Child's Pose - 2 x daily - 7 x weekly - 2 reps - 30 hold    THERAPEUTIC ACTIVITY: ( 0 minutes): Instruction/education on PFM role/function in relation to patient symptoms. Patient Q&A. TA Educational Topic Notes Date Completed   Pathology/Anatomy/PFM Function  5/13/21   Bladder health education     Urinary urge suppression     The knack     Voiding strategies     Bowel/Bladder log     Bowel health education     Constipation care     Diarrhea/Fecal leakage     Colonic massage     Toilet positioning     Defecation dynamics     Sources of fiber     Return to intercourse/Dilator training     Sexual positioning     Lubricants/vaginal moisturizers     Vaginal irritants     Body mechanics     Posture/ergonomics     Diaphragmatic breathing     Resources and technology       MANUAL THERAPY: (30 minutes): Soft tissue mobilization was utilized and necessary because of the patient's painful spasm and restricted motion of soft tissue.     Date Type Location Comments   5/26/2021 Internal assessment/treatment Via vaginal canal Single digit MT to all PFM    STM adductor Skin rolling into ischiorectal fossa; pudendal nerve glide- inferior rectal and perineal branches                                 (Used abbreviations: MET - muscle energy technique; SCS- Strain counter strain; CTM-Connective tissue mobilizations; CR- Contract/Relax; SP- Sustained pressure; PIT- Positional inhibition techniques; STM Soft -tissue mobilization; MM- Myofascial mobilization; TrP-Trigger point release; IASTM- Instrument assisted soft tissue mobilizations, TDN-Trigger point dry needling)    Pt gives verbal consent to internal vaginal assessment/treatment without chaperon present. Treatment/Session Summary:    · Response to Treatment: Pt with decreased burning sensation at introitus today. She does note tenderness bilaterally at SPT and alcock's canal, R>L with improvements implementing SCS. There is significant tightness noted throughout the vaginal vault and all PFM with improve with gentle manual therapy. Discussed and updated HEP to include cat cow to fatou pose and sciatic nerve glides. Emphasis on going to point of discomfort or stretch but not pushing into pain. She was additionally instructed in self superficial stretch of PFM for desensitization of tissue over the next few weeks. · Communication/Consultation:  None today  · Equipment provided today:  None today  · Recommendations/Intent for next treatment session: Next visit will focus on down training, manual therapy as tolerated- dilators?, flexibility.   Total Treatment Billable Duration: 30 minutes MT, 25 minutes  PT Patient Time In/Time Out  Time In: 0900  Time Out: 1000  Balta Bynum PT, DPT     Future Appointments   Date Time Provider Bridget Underwoodisti   6/9/2021  9:00 AM Kaylene Zapata PT, DPT [facility]   6/11/2021 12:30 PM Angelo Rizo MD [facility]   6/16/2021  7:00 PM Kaylene Zapata PT, DPT [facility]   6/28/2021 11:00 AM Kaylene Zapata PT, DPT [facility]   7/14/2021 11:00 AM Herb Delgado PT, DPT [facility] 7/21/2021 11:00 AM Leena Delgado, PT, DPT [facility]   7/28/2021 11:00 AM Leena Delgado, SADA, DPT [facility]

## 2021-06-09 ENCOUNTER — HOSPITAL ENCOUNTER (OUTPATIENT)
Dept: PHYSICAL THERAPY | Age: 61
Discharge: HOME | End: 2021-06-09
Payer: COMMERCIAL

## 2021-06-09 PROCEDURE — 97140 MANUAL THERAPY 1/> REGIONS: CPT

## 2021-06-09 PROCEDURE — 97530 THERAPEUTIC ACTIVITIES: CPT

## 2021-06-09 PROCEDURE — 97110 THERAPEUTIC EXERCISES: CPT

## 2021-06-09 NOTE — PROGRESS NOTES
Manuel Ennis  : 1960  Primary: [facility]*  Secondary:  [facility]  [address] , [address], [place] [address]  [phone]   [phone]      OUTPATIENT PHYSICAL THERAPY: Daily Treatment Note 2021   Visit Count:  4  ICD-10: Treatment Diagnosis: Lack of coordination (muscle incoordination) (R27.8), Pelvic floor dysfunction in female (M62.98), Stress incontinence (female) (male) (N39.3), Low back pain (M54.5 ), Pelvic and perineal pain (R10.2) and Myalgia (M79.1)  Precautions/Allergies:   Axid [nizatidine], Other medication, Simcor [niacin-simvastatin], Sulfa (sulfonamide antibiotics), Crestor [rosuvastatin], Erythromycin, and Shellfish containing products  TREATMENT PLAN:  Effective Dates: 2021 TO 8/10/2021 (90 days). Frequency/Duration: 1-2 time a week for 90 Day(s) MEDICAL/REFERRING DIAGNOSIS:  PFD (pelvic floor dysfunction) [M62.89]  DATE OF ONSET: chronic  REFERRING PHYSICIAN: Carolina Paredes MD MD Orders: Evaluate and treat  Return MD Appointment: 3 months- 2021     Pre-treatment Symptoms/Complaints:  Pelvic pain with intercourse, ROBBY    Has stopped doing happy baby stretch because was having some pain in back when doing this exercise. She did get the Rx she has been trying to get. Continues to have pinching on R side. Pain: Initial: Pain Intensity 1: 4  Post Session:  2-3/10   Medications Last Reviewed:  2021  Updated Objective Findings:  concordant pinching/burning with palpation R STP/DTP, improved but not resolved with SCS   TREATMENT:   THERAPEUTIC EXERCISE: (10 minutes):  Exercises per grid below to improve mobility and coordination. Required moderate visual, verbal and tactile cues to promote proper body breathing techniques and relaxation time. Progressed range and repetitions as indicated.   TE= therapeutic exercise, TA= therapeutic activity, MT= manual therapy, NE= neuro re-education Date:  5/12/21 Date:  5/20/21 Date:  5/26/21 Date:  6/9/21   Activity/Exercise Parameters Parameters Parameters    PFM coordination PFM drops to improve relaxation, ROM and pain Drops w/ sEMG Drops with digital palpation Drops with digital palpation; reviewed and re instructed to improve understanding and performance   Diaphragmatic breathing To improve PFM ROM and excursion      Adductor stretch  3x30s reviewed    Happy baby stretch  3x30s reviewed    Piriformis strech  3x30s, cross body reviewed    HEP  See below See below  Superficial self stretch    Cat/cow    x10    Karen Shoulder pose   x30s    Sciatic nerve glide   x10    Single knee to chest stretch    5 minutes (knee bent and straight)   Access Code: [email or web address]  URL: [email or web address]  Date: 05/20/2021  Prepared by: Elpidio Allen    Exercises  Supine Butterfly Groin Stretch - 2 x daily - 7 x weekly - 3 reps - 30 hold  Single knee to chest stretch - 2 x daily - 7 x weekly - 3 reps - 30 hold  Supine Piriformis Stretch with Foot on Ground - 2 x daily - 7 x weekly - 3 reps - 30 hold  PFM drops [id number]  Diaphragmatic breathing [id number]  Seated Slump Nerve Glide - 1 x daily - 7 x weekly - 10 reps  Cat-Camel to Child's Pose - 2 x daily - 7 x weekly - 2 reps - 30 hold    THERAPEUTIC ACTIVITY: ( 15 minutes): Extensive functional activity training on internal self manual therapy with use of pad of thumb to increase patient's ability to relax pelvic floor musculature and increase independence with self management of condition. Patient provided with verbal and visual cues for correct performance of internal pelvic floor muscle release with instruction to not work on distinct muscle >30 seconds and to stop if experiencing pain >4/10 or sudden sharp pain. Care taken to educate patient on not applying direct pressure to urethra during manual therapy.  Patient can perform 25% PFM contraction for contract relax technique, PFM drop and/or diaphragmatic breathing to improve relaxation. and Patient education on role and use of vaginal lubricant and/or moisturizer in order to improve tissue health and decrease vaginal/vulvar irritation. TA Educational Topic Notes Date Completed   Pathology/Anatomy/PFM Function  5/13/21   Bladder health education     Urinary urge suppression     The knack     Voiding strategies     Bowel/Bladder log     Bowel health education     Constipation care     Diarrhea/Fecal leakage     Colonic massage     Toilet positioning     Defecation dynamics     Sources of fiber     Return to intercourse/Dilator training superficial PFM stretch w/ thumb 6/9/21   Sexual positioning     Lubricants/vaginal moisturizers Use of topical to vulvar/vestibule area as able 6/9/21   Vaginal irritants     Body mechanics     Posture/ergonomics     Diaphragmatic breathing     Resources and technology       MANUAL THERAPY: (30 minutes): Soft tissue mobilization was utilized and necessary because of the patient's painful spasm and restricted motion of soft tissue. Date Type Location Comments   6/9/2021 Internal assessment/treatment Via vaginal canal Single digit MT to all PFM; increased time spent on R side to improve tissue mobility and decreased sensitivity/pain    STM adductor Skin rolling into ischiorectal fossa; pudendal nerve glide- inferior rectal and perineal branches                                 (Used abbreviations: MET - muscle energy technique; SCS- Strain counter strain; CTM-Connective tissue mobilizations; CR- Contract/Relax; SP- Sustained pressure; PIT- Positional inhibition techniques; STM Soft -tissue mobilization; MM- Myofascial mobilization; TrP-Trigger point release; IASTM- Instrument assisted soft tissue mobilizations, TDN-Trigger point dry needling)    Pt gives verbal consent to internal vaginal assessment/treatment without chaperon present.     Treatment/Session Summary:    · Response to Treatment: Patient continues to have significant tenderness with palpation of right side pelvic floor muscles at all layers. She reports concordant pinching and burning discomfort with palpation of right STP and DTP muscles. She note reduced tenderness and pain with use of strain counter strain and manual therapy techniques to improve tissue mobility and relaxation of muscles, however not resolution of discomfort, possibly indicating tissue health as a contributing factor to pain. She continues to be unable to correctly perform pelvic floor muscle drop exercise. For this reason, exercise was re instructed to improve understanding and performance as patient is likely increasing pelvic floor muscle tension contributing to more consistency of pain. Additionally, patient was re instructed in use of thumb for self-mobilization of the superficial pelvic floor muscles with instruction on various techniques to improve tenderness as needed. · Communication/Consultation:  None today  · Equipment provided today:  None today  · Recommendations/Intent for next treatment session: Next visit will focus on down training, manual therapy as tolerated- dilators?, flexibility.   Total Treatment Billable Duration: 30 minutes MT, 15 minutes TA, 10 minutes TE  PT Patient Time In/Time Out  Time In: 0900  Time Out: [address] [facility] [address] PT, DPT     Future Appointments   Date Time Provider Bridget Venegas   6/11/2021 12:30 PM Ashley Fonseca MD [facility]   6/16/2021  7:00 PM Juana Shearer PT, [facility]   6/28/2021 11:00 AM Quyen Delgado PT, DPT [facility]   7/14/2021 11:00 AM Juana Shearer PT, DPT [facility]   7/21/2021 11:00 AM Juana Shearer PT, DPT [facility]   7/28/2021 11:00 AM Quyen Delgado PT, DPT [facility]

## 2021-06-16 ENCOUNTER — HOSPITAL ENCOUNTER (OUTPATIENT)
Dept: PHYSICAL THERAPY | Age: 61
Discharge: HOME | End: 2021-06-16
Payer: COMMERCIAL

## 2021-06-16 NOTE — PROGRESS NOTES
Bettina Oneill  : 1960  Primary: [facility]  Secondary:  [facility] [place] [place] [address], [address], [place] [address]  [phone]   [phone]      OUTPATIENT DAILY NOTE    NAME/AGE/GENDER: Bettina Oneill is a 56 y.o. female. DATE: 2021    Ms. Arcenio Murrieta for today's appointment due to out of town >24 hours.     Withams Selma, PT, DPT

## 2021-06-28 ENCOUNTER — HOSPITAL ENCOUNTER (OUTPATIENT)
Dept: PHYSICAL THERAPY | Age: 61
Discharge: HOME | End: 2021-06-28
Payer: COMMERCIAL

## 2021-06-28 PROCEDURE — 97140 MANUAL THERAPY 1/> REGIONS: CPT

## 2021-06-28 PROCEDURE — 97530 THERAPEUTIC ACTIVITIES: CPT

## 2021-06-28 NOTE — PROGRESS NOTES
Michellelisydavid Potter  : 1960  Primary: [facility]  Secondary:  [facility]  [address] , [address], [place] [address]  [phone]   [phone]      OUTPATIENT PHYSICAL THERAPY: Daily Treatment Note 2021   Visit Count:  5  ICD-10: Treatment Diagnosis: Lack of coordination (muscle incoordination) (R27.8), Pelvic floor dysfunction in female (M62.98), Stress incontinence (female) (male) (N39.3), Low back pain (M54.5 ), Pelvic and perineal pain (R10.2) and Myalgia (M79.1)  Precautions/Allergies:   Axid [nizatidine], Other medication, Simcor [niacin-simvastatin], Sulfa (sulfonamide antibiotics), Crestor [rosuvastatin], Erythromycin, and Shellfish containing products  TREATMENT PLAN:  Effective Dates: 2021 TO 8/10/2021 (90 days). Frequency/Duration: 1-2 time a week for 90 Day(s) MEDICAL/REFERRING DIAGNOSIS:  PFD (pelvic floor dysfunction) [M62.89]  DATE OF ONSET: chronic  REFERRING PHYSICIAN: Isela Jacob MD MD Orders: Evaluate and treat  Return MD Appointment: 21     Pre-treatment Symptoms/Complaints:  Pelvic pain with intercourse, ROBBY    Pt reports improvements in overall pain. Has been doing exercises and superficial manual stretch as much as possible during vacation. Has added back bridges and planks to exercises for back without increased symptoms. Stretches are becoming easier. Pain: Initial: Pain Intensity 1: 4  Post Session:  2-3/10   Medications Last Reviewed:  2021  Updated Objective Findings:  R anterior innominate, improved with MET; sensitivity with labial CTM R side only   TREATMENT:   THERAPEUTIC EXERCISE: (3 minutes):  Exercises per grid below to improve mobility and coordination. Required moderate visual, verbal and tactile cues to promote proper body breathing techniques and relaxation time. Progressed range and repetitions as indicated.   TE= therapeutic exercise, TA= therapeutic activity, MT= manual therapy, NE= neuro re-education   Date:  5/12/21 Date:  5/20/21 Date:  5/26/21 Date:  6/9/21 Date:  6/28/21   Activity/Exercise Parameters Parameters Parameters     PFM coordination PFM drops to improve relaxation, ROM and pain Drops w/ sEMG Drops with digital palpation Drops with digital palpation; reviewed and re instructed to improve understanding and performance    Diaphragmatic breathing To improve PFM ROM and excursion       Adductor stretch  3x30s reviewed     Happy baby stretch  3x30s reviewed     Piriformis strech  3x30s, cross body reviewed  Figure 4 stretch demo   HEP  See below See below  Superficial self stretch  Stretching after strengthening   Cat/cow    x10     Bossman Jeff pose   x30s     Sciatic nerve glide   x10     Single knee to chest stretch    5 minutes (knee bent and straight)      THERAPEUTIC ACTIVITY: ( 10 minutes): Extensive functional activity training on internal self manual therapy with use of pad of thumb to increase patient's ability to relax pelvic floor musculature and increase independence with self management of condition. Patient provided with verbal and visual cues for correct performance of internal pelvic floor muscle release with instruction to not work on distinct muscle >30 seconds and to stop if experiencing pain >4/10 or sudden sharp pain. Care taken to educate patient on not applying direct pressure to urethra during manual therapy. Patient can perform 25% PFM contraction for contract relax technique, PFM drop and/or diaphragmatic breathing to improve relaxation. and Patient education on role and use of vaginal lubricant and/or moisturizer in order to improve tissue health and decrease vaginal/vulvar irritation.   TA Educational Topic Notes Date Completed   Pathology/Anatomy/PFM Function  5/13/21   Bladder health education     Urinary urge suppression     The ivelisse     Voiding strategies     Bowel/Bladder log     Bowel health education     Constipation care Diarrhea/Fecal leakage     Colonic massage     Toilet positioning     Defecation dynamics     Sources of fiber     Return to intercourse/Dilator training superficial PFM stretch w/ thumb 6/9/21   Sexual positioning     Lubricants/vaginal moisturizers Use of topical to vulvar/vestibule area as able 6/9/21   Vaginal irritants     Body mechanics     Posture/ergonomics     Diaphragmatic breathing     Resources and technology     Patient education Role and use of vaginal valium 6/28/21     MANUAL THERAPY: (45 minutes): Soft tissue mobilization was utilized and necessary because of the patient's painful spasm and restricted motion of soft tissue. Date Type Location Comments   6/28/2021 Internal assessment/treatment Via vaginal canal Single digit MT to all PFM; increased time spent on R side to improve tissue mobility and decreased sensitivity/pain    STM adductor Skin rolling into ischiorectal fossa; pudendal nerve glide- inferior rectal and perineal and dorsal n. To clitoris branches  Inferior rectal w/ bridging    MET SI Anterior innominate; activation R glutes/hamstring, L quad                           (Used abbreviations: MET - muscle energy technique; SCS- Strain counter strain; CTM-Connective tissue mobilizations; CR- Contract/Relax; SP- Sustained pressure; PIT- Positional inhibition techniques; STM Soft -tissue mobilization; MM- Myofascial mobilization; TrP-Trigger point release; IASTM- Instrument assisted soft tissue mobilizations, TDN-Trigger point dry needling)    Pt gives verbal consent to internal vaginal assessment/treatment without chaperon present. Treatment/Session Summary:    · Response to Treatment: Pt continues to have R side pudendal nerve irritation, although intensity of pain appears to be improving per pt report. Asymmetries noted in pelvic alignment with significant R anterior innominate rotation. Alignment improve slightly, but not completely with MET.  We discussed how this could be playing into pudendal and sciatic nerve irritation. Pt to continue with focus on down regulation. We discussed implementing stretching exercises post strengthening in order to improve PFM relaxation after activation. Pt with questions regarding use/timing of vaginal valium.  We discussed that this was prescribed for her to use PRN, meaning she can use as needed for pain, with intercourse or PT, etc.   · Communication/Consultation:  None today  · Equipment provided today:  None today  · Recommendations/Intent for next treatment session: Next visit will focus on down training, manual therapy as tolerated- dilators?, flexibility, recheck pelvic alignment  Total Treatment Billable Duration: 45 minutes MT, 10 minutes TA  PT Patient Time In/Time Out  Time In: 1055  Time Out: [facility], PT, DPT     Future Appointments   Date Time Provider Bridget Venegas   7/14/2021 11:00 AM Sangeeta Argueta, PT, DPT [facility]   7/21/2021 11:00 AM Sangeeta Argueta PT, DPT [facility]   7/28/2021 11:00 AM Sangeeta Argueta PT, DPT [facility]   10/5/2021  1:45 PM MD EDUIN Husain

## 2021-07-14 ENCOUNTER — HOSPITAL ENCOUNTER (OUTPATIENT)
Dept: PHYSICAL THERAPY | Age: 61
Discharge: HOME | End: 2021-07-14
Payer: COMMERCIAL

## 2021-07-14 PROCEDURE — 97110 THERAPEUTIC EXERCISES: CPT

## 2021-07-14 PROCEDURE — 97140 MANUAL THERAPY 1/> REGIONS: CPT

## 2021-07-14 NOTE — PROGRESS NOTES
Dara Conquest  : 1960  Primary: Ashley Valle Of Emmanuelle Acuna*  Secondary:  Therapy Center at [facility]  [facility], [address], [place] [address] [address]  [phone]   [phone]      OUTPATIENT PHYSICAL THERAPY: Daily Treatment Note 2021   Visit Count:  6  ICD-10: Treatment Diagnosis: Lack of coordination (muscle incoordination) (R27.8), Pelvic floor dysfunction in female (M62.98), Stress incontinence (female) (male) (N39.3), Low back pain (M54.5 ), Pelvic and perineal pain (R10.2) and Myalgia (M79.1)  Precautions/Allergies:   Axid [nizatidine], Other medication, Simcor [niacin-simvastatin], Sulfa (sulfonamide antibiotics), Crestor [rosuvastatin], Erythromycin, and Shellfish containing products  TREATMENT PLAN:  Effective Dates: 2021 TO 8/10/2021 (90 days). Frequency/Duration: 1-2 time a week for 90 Day(s) MEDICAL/REFERRING DIAGNOSIS:  PFD (pelvic floor dysfunction) [M62.89]  DATE OF ONSET: chronic  REFERRING PHYSICIAN: Jose Eduardo Granados MD MD Orders: Evaluate and treat  Return MD Appointment: 21     Pre-treatment Symptoms/Complaints:  Pelvic pain with intercourse, ROBBY    Pt states that she did well in regards to pain during traveling until about half way through her trip. 10+ hours drives and frequent trips in the car. Pt had increased R leg cramping and low back pain. She noticed after this she was having increased vulvar pain. Provoking factors, rising from seated positioning and sitting for prolonged periods. Pain: Initial: Pain Intensity 1: 9  Pain Location 1: Spine, lumbar (7- perineum)  Post Session:  6/10 (lumbar)  5/10 (vulvar/perineum)   Medications Last Reviewed:  2021  Updated Objective Findings:  SLR test + w/ increased R LE cramping; signfiicant tednerness with palpation B QL and spinous processes along lower lumbar spine;  Hypomobility throughout lumber and thoracic spine   TREATMENT:   THERAPEUTIC EXERCISE: (25 minutes):  Exercises per grid below to improve mobility and coordination. Required moderate visual, verbal and tactile cues to promote proper body breathing techniques. Progressed range and repetitions as indicated. TE= therapeutic exercise, TA= therapeutic activity, MT= manual therapy, NE= neuro re-education   Date:  5/12/21 Date:  5/20/21 Date:  5/26/21 Date:  6/9/21 Date:  6/28/21 Date:  7/14/21   Activity/Exercise Parameters Parameters Parameters      PFM coordination PFM drops to improve relaxation, ROM and pain Drops w/ sEMG Drops with digital palpation Drops with digital palpation; reviewed and re instructed to improve understanding and performance     Diaphragmatic breathing To improve PFM ROM and excursion        Adductor stretch  3x30s reviewed      Happy baby stretch  3x30s reviewed      Piriformis strech  3x30s, cross body reviewed  Figure 4 stretch demo    HEP  See below See below  Superficial self stretch  Stretching after strengthening Prone on elbows as needed to reduce pain   Cat/cow    x10      D[place] pose   x30s      Sciatic nerve glide   x10      Single knee to chest stretch    5 minutes (knee bent and straight)     Prone on elbows      x10 with gentle OP   MET      R hamstring/L quad to improve pelvic alignment   Hip abduction      X10, isometric   Hip adduction      X10, isometric     THERAPEUTIC ACTIVITY: (0 minutes): Extensive functional activity training on internal self manual therapy with use of pad of thumb to increase patient's ability to relax pelvic floor musculature and increase independence with self management of condition. Patient provided with verbal and visual cues for correct performance of internal pelvic floor muscle release with instruction to not work on distinct muscle >30 seconds and to stop if experiencing pain >4/10 or sudden sharp pain. Care taken to educate patient on not applying direct pressure to urethra during manual therapy.  Patient can perform 25% PFM contraction for contract relax technique, PFM drop and/or diaphragmatic breathing to improve relaxation. and Patient education on role and use of vaginal lubricant and/or moisturizer in order to improve tissue health and decrease vaginal/vulvar irritation. TA Educational Topic Notes Date Completed   Pathology/Anatomy/PFM Function  5/13/21   Bladder health education     Urinary urge suppression     The knack     Voiding strategies     Bowel/Bladder log     Bowel health education     Constipation care     Diarrhea/Fecal leakage     Colonic massage     Toilet positioning     Defecation dynamics     Sources of fiber     Return to intercourse/Dilator training superficial PFM stretch w/ thumb 6/9/21   Sexual positioning     Lubricants/vaginal moisturizers Use of topical to vulvar/vestibule area as able 6/9/21   Vaginal irritants     Body mechanics     Posture/ergonomics     Diaphragmatic breathing     Resources and technology     Patient education Role and use of vaginal valium 6/28/21     MANUAL THERAPY: (30 minutes): Joint mobilization and Soft tissue mobilization was utilized and necessary because of the patient's restricted joint motion, painful spasm and restricted motion of soft tissue. Date Type Location Comments   7/14/2021 Internal assessment/treatment Via vaginal canal Single digit MT to all PFM    STM adductor Skin rolling into ischiorectal fossa; pudendal nerve glide- inferior rectal and perineal and dorsal n.  To clitoris branches  Inferior rectal w/ bridging    Joint mobilization Lumbar/thoracic Grade 1    STM Lumbar spine QL                     (Used abbreviations: MET - muscle energy technique; SCS- Strain counter strain; CTM-Connective tissue mobilizations; CR- Contract/Relax; SP- Sustained pressure; PIT- Positional inhibition techniques; STM Soft -tissue mobilization; MM- Myofascial mobilization; TrP-Trigger point release; IASTM- Instrument assisted soft tissue mobilizations, TDN-Trigger point dry needling)    Pt gives verbal consent to internal vaginal assessment/treatment without chaperon present. Treatment/Session Summary:    · Response to Treatment: Patient presents with significantly increased pain levels in low back and vulvar area today. She reports significant increase in pain about midway through her trip that she attributes to prolonged sitting during traveling. Treatment today to focus on lumbar spine and reassessment of pain. Patient with significant tenderness over lower lumbar spine with grade 1 mobilizations and along QL bilaterally. Patient with significant hypomobility of both lumbar and thoracic spine however thoracic spine is non painful with joint mobilizations. Patient with reduced pain post treatment in both lumbar spine and vulva. It appears that vulvar pain that patient is experiencing is likely originating in the lumbar and radiating into vulva and LE. Agreeable to addition of 1x/week with ortho in order to more comprehensively address contributing musculoskeletal factors to pain. · Communication/Consultation:  None today  · Equipment provided today:  None today  · Recommendations/Intent for next treatment session: Next visit will focus on lumbar spine assessment, recheck pelvic alignment- possible leg length discrepancy?   Total Treatment Billable Duration: 30 minutes MT, 25 minutes TA  PT Patient Time In/Time Out  Time In: 1100  Time Out: 600 [facility], PT, DPT     Future Appointments   Date Time Provider Bridget Rubi   7/16/2021 11:00 AM Ngoc Obando, PT [facility]   7/21/2021 11:00 AM Celina Delgado, PT, DPT [facility]   7/23/2021 10:30 AM Uriah Allan, PT [facility]   7/28/2021 11:00 AM Lorne Garibay, PT, DPT [facility]   10/5/2021  1:45 PM MD EDUIN Mae

## 2021-07-16 ENCOUNTER — HOSPITAL ENCOUNTER (OUTPATIENT)
Dept: PHYSICAL THERAPY | Age: 61
Discharge: HOME | End: 2021-07-16
Payer: COMMERCIAL

## 2021-07-16 PROCEDURE — 97110 THERAPEUTIC EXERCISES: CPT

## 2021-07-16 NOTE — PROGRESS NOTES
Amanda Tovar  : 1960  Primary: Malik Ibarra Of Emmanuelle Acuna*  Secondary:  [facility], [address], [place] [address]  [phone]   [phone]      OUTPATIENT PHYSICAL THERAPY: Daily Treatment Note 2021   Visit Count:  7  ICD-10: Treatment Diagnosis: Lack of coordination (muscle incoordination) (R27.8), Pelvic floor dysfunction in female (M62.98), Stress incontinence (female) (male) (N39.3), Low back pain (M54.5 ), Pelvic and perineal pain (R10.2) and Myalgia (M79.1)  Precautions/Allergies:   Axid [nizatidine], Other medication, Simcor [niacin-simvastatin], Sulfa (sulfonamide antibiotics), Crestor [rosuvastatin], Erythromycin, and Shellfish containing products  TREATMENT PLAN:  Effective Dates: 2021 TO 8/10/2021 (90 days). Frequency/Duration: 1-2 time a week for 90 Day(s) MEDICAL/REFERRING DIAGNOSIS:  PFD (pelvic floor dysfunction) [M62.89]  DATE OF ONSET: chronic  REFERRING PHYSICIAN: Layne Sears MD MD Orders: Evaluate and treat  Return MD Appointment: 21     Pre-treatment Symptoms/Complaints:  Low back pain  PMHx:  Patient reports having complaint of acute low back pain 3 years ago with inability to stand/walk resulting in a dissectomy at L4-5;  hospitalized for 5 days. She continued to have low back treated with nerve block in 2019 and previously an epidural.  Current symptoms aggravated with prolonged sitting 1-2 hours, vacuuming/sweeping, and using jet ski. Pt states that she did well in regards to pain during traveling until about half way through her trip. 10+ hours drives and frequent trips in the car. Pain is constant in low back region intermittenty radiating to right calf, with intense leg cramps. Pt had increased R leg cramping and low back pain. She noticed after this she was having increased vulvar pain. Provoking factors, rising from seated positioning and sitting for prolonged periods.   Pain: Initial:    Post Session:  6/10 (lumbar)  5/10 (vulvar/perineum)   Medications Last Reviewed:  7/16/2021  Updated Objective Findings:  ROM:  lumbar 60 (uanble to erct w/o use of arms) lumbar ext 20, Right side bend 75%,  left 100%,  rotation rith 100%, left  75%. Rib rib hump (mild). Hypomobile thoracic spine with CPA/UPA   TREATMENT:   THERAPEUTIC EXERCISE: (55 minutes):  Exercises per grid below to improve mobility, strength and coordination. Required minimal visual, verbal and tactile cues to promote proper body alignment and promote proper body posture. Progressed range and repetitions as indicated.   TE= therapeutic exercise, TA= therapeutic activity, MT= manual therapy, NE= neuro re-education   Date:  5/20/21 Date:  5/26/21 Date:  6/9/21 Date:  6/28/21 Date:  7/14/21 Date:   7/16/21   Activity/Exercise Parameters Parameters       PFM coordination Drops w/ sEMG Drops with digital palpation Drops with digital palpation; reviewed and re instructed to improve understanding and performance      Diaphragmatic breathing         Adductor stretch 3x30s reviewed       Happy baby stretch 3x30s reviewed       Piriformis strech 3x30s, cross body reviewed  Figure 4 stretch demo     HEP See below See below  Superficial self stretch  Stretching after strengthening Prone on elbows as needed to reduce pain 5'   Cat/cow   x10       [place] pose  x30s       Sciatic nerve glide  x10       Single knee to chest stretch   5 minutes (knee bent and straight)   3 x 30 secs   Prone on elbows     x10 with gentle OP 10x   MET     R hamstring/L quad to improve pelvic alignment 5' for anterior rotation correction   Hip abduction     X10, isometric 2 x 10 lime TB   Hip adduction     X10, isometric 2 x 10 with ball   Pelvic tilt         Ta bracing      8'   Dead bug      Arms only 2 x 10   Supine march      1 x 10   Functional assessment      10'              THERAPEUTIC ACTIVITY: (0 minutes): Extensive functional activity training on internal self manual therapy with use of pad of thumb to increase patient's ability to relax pelvic floor musculature and increase independence with self management of condition. Patient provided with verbal and visual cues for correct performance of internal pelvic floor muscle release with instruction to not work on distinct muscle >30 seconds and to stop if experiencing pain >4/10 or sudden sharp pain. Care taken to educate patient on not applying direct pressure to urethra during manual therapy. Patient can perform 25% PFM contraction for contract relax technique, PFM drop and/or diaphragmatic breathing to improve relaxation. and Patient education on role and use of vaginal lubricant and/or moisturizer in order to improve tissue health and decrease vaginal/vulvar irritation. TA Educational Topic Notes Date Completed   Pathology/Anatomy/PFM Function  5/13/21   Bladder health education     Urinary urge suppression     The knack     Voiding strategies     Bowel/Bladder log     Bowel health education     Constipation care     Diarrhea/Fecal leakage     Colonic massage     Toilet positioning     Defecation dynamics     Sources of fiber     Return to intercourse/Dilator training superficial PFM stretch w/ thumb 6/9/21   Sexual positioning     Lubricants/vaginal moisturizers Use of topical to vulvar/vestibule area as able 6/9/21   Vaginal irritants     Body mechanics     Posture/ergonomics     Diaphragmatic breathing     Resources and technology     Patient education Role and use of vaginal valium 6/28/21     MANUAL THERAPY: (0 minutes): Joint mobilization and Soft tissue mobilization was utilized and necessary because of the patient's restricted joint motion, painful spasm and restricted motion of soft tissue.   not performed   Date Type Location Comments   7/16/2021 Internal assessment/treatment Via vaginal canal Single digit MT to all PFM    STM adductor Skin rolling into ischiorectal fossa; pudendal nerve glide- inferior rectal and perineal and dorsal n. To clitoris branches  Inferior rectal w/ bridging    Joint mobilization Lumbar/thoracic Grade 1    STM Lumbar spine QL                     (Used abbreviations: MET - muscle energy technique; SCS- Strain counter strain; CTM-Connective tissue mobilizations; CR- Contract/Relax; SP- Sustained pressure; PIT- Positional inhibition techniques; STM Soft -tissue mobilization; MM- Myofascial mobilization; TrP-Trigger point release; IASTM- Instrument assisted soft tissue mobilizations, TDN-Trigger point dry [facility]  [employer] PortalAccess Code: [email or web address]  URL: [email or web address]  Date: 07/18/2021  Prepared by: Aurora Roberto    Exercises  Supine Posterior Pelvic Tilt - 1 x daily - 7 x weekly - 1 sets - 10 reps - 5 hold  Supine Transversus Abdominis Bracing - Hands on Stomach - 2 x daily - 7 x weekly - 10 reps - 10 hold  Supine Hip Adduction Isometric with Ball - 2 x daily - 7 x weekly - 3 sets - 10 reps  Hooklying Clamshell with Resistance - 2 x daily - 7 x weekly - 2 sets - 10 reps  Prone Press Up on Elbows - 1 x daily - 7 x weekly - 5 reps - 5 hold    Treatment/Session Summary:    · Response to Treatment: Patient tolerated treatment well. On exam she displays decreased lumbar ROM with inability to return to upright with use of hands. Decreased core strength and muscle imbalance. Patient will benefit with ortho PT once a week to address lumbar spine deficits  · Communication/Consultation:  None today  · Equipment provided today:  None today  · Recommendations/Intent for next treatment session: Next visit will focus on lumbar spine assessment, recheck pelvic alignment- possible leg length discrepancy?   Total Treatment Billable Duration: 55 minutes TE  PT Patient Time In/Time Out  Time In: 1100  Time Out: [address] [place] [address], PT     Future Appointments   Date Time Provider Bridget Rubi   7/21/2021  7:00 PM Constantin Delgado, PT, DPT [facility]   7/22/2021  9:00 AM Magdy Delgado, PT, DPT [facility]   7/23/2021 10:30 AM Edil Sinha, PT [facility]   7/27/2021 11:15 AM Uriah Baumann, PT [facility]   7/28/2021 11:00 AM [facility]illa [facility]   8/5/2021 10:30 AM Edil Sinha, PT [facility]   10/5/2021  1:45 PM MD EDUIN Marte

## 2021-07-21 ENCOUNTER — APPOINTMENT (OUTPATIENT)
Dept: PHYSICAL THERAPY | Age: 61
End: 2021-07-21
Payer: COMMERCIAL

## 2021-07-22 ENCOUNTER — HOSPITAL ENCOUNTER (OUTPATIENT)
Dept: PHYSICAL THERAPY | Age: 61
Discharge: HOME | End: 2021-07-22
Payer: COMMERCIAL

## 2021-07-22 PROCEDURE — 97110 THERAPEUTIC EXERCISES: CPT

## 2021-07-22 PROCEDURE — 97140 MANUAL THERAPY 1/> REGIONS: CPT

## 2021-07-22 NOTE — PROGRESS NOTES
Misty Quintero  : 1960  Primary: Mario Breed Of Emmanuelle Acuna*  Secondary:  Therapy Center at [facility]  [facility], [address], [place] [address]  [phone]   [phone]      OUTPATIENT PHYSICAL THERAPY: Daily Treatment Note 2021   Visit Count:  8  ICD-10: Treatment Diagnosis: Lack of coordination (muscle incoordination) (R27.8), Pelvic floor dysfunction in female (M62.98), Stress incontinence (female) (male) (N39.3), Low back pain (M54.5), Pelvic and perineal pain (R10.2) and Myalgia (M79.1)  Precautions/Allergies:   Axid [nizatidine], Other medication, Simcor [niacin-simvastatin], Sulfa (sulfonamide antibiotics), Crestor [rosuvastatin], Erythromycin, and Shellfish containing products  TREATMENT PLAN:  Effective Dates: 2021 TO 8/10/2021 (90 days). Frequency/Duration: 1-2 time a week for 90 Day(s) MEDICAL/REFERRING DIAGNOSIS:  PFD (pelvic floor dysfunction) [M62.89]  DATE OF ONSET: chronic  REFERRING PHYSICIAN: Ariel Villanueva MD MD Orders: Evaluate and treat  Return MD Appointment: 21     Pre-treatment Symptoms/Complaints:  Low back pain/vuvlar pain  Pain has been much better. Has been resting a little more this week. Used valium before PT session on Friday. This caused her to be a bit drowsy and feel asleep several times. Leg cramps continue to be \"pretty bad\", but able to use gentle stretching to alleviate pain some. Pain: Initial: Pain Intensity 1: 4  Pain Location 1: Spine, lumbar   3/10 (vulvar) Post Session: 3/10 lumbar; 2-3/10 vulvar   Medications Last Reviewed:  2021  Updated Objective Findings:  Tenderness R>L w/ dorsal n to clitoria nn glides   TREATMENT:   THERAPEUTIC EXERCISE: (25 minutes):  Exercises per grid below to improve mobility, strength and coordination. Required moderate visual, verbal and tactile cues to promote proper body alignment, promote proper body posture and promote proper body breathing techniques.   Progressed resistance, range, repetitions and complexity of movement as indicated. TE= therapeutic exercise, TA= therapeutic activity, MT= manual therapy, NE= neuro re-education   Date:  5/26/21 Date:  6/9/21 Date:  6/28/21 Date:  7/14/21 Date:   7/16/21 Date:  7/22/21   Activity/Exercise Parameters        PFM coordination Drops with digital palpation Drops with digital palpation; reviewed and re instructed to improve understanding and performance       Diaphragmatic breathing         Adductor stretch reviewed        Happy baby stretch reviewed        Piriformis strech reviewed  Figure 4 stretch demo   3x30s, crossbody   HEP See below  Superficial self stretch  Stretching after strengthening Prone on elbows as needed to reduce pain 5'    Cat/cow  x10        [place] pose x30s        Sciatic nerve glide x10        Single knee to chest stretch  5 minutes (knee bent and straight)   3 x 30 secs    Prone on elbows    x10 with gentle OP 10x x10   MET    R hamstring/L quad to improve pelvic alignment 5' for anterior rotation correction    Hip abduction    X10, isometric 2 x 10 lime TB    Hip adduction    X10, isometric 2 x 10 with ball    Ta bracing     8' 5x10s   Dead bug     Arms only 2 x 10 Arms only 2 x 10   Supine march     1 x 10 x10   Functional assessment     10'    Thoracic rotation/open book      x10 B   Bridge       X12, w/ cuing TA and exhale     THERAPEUTIC ACTIVITY: (0 minutes): Extensive functional activity training on internal self manual therapy with use of pad of thumb to increase patient's ability to relax pelvic floor musculature and increase independence with self management of condition. Patient provided with verbal and visual cues for correct performance of internal pelvic floor muscle release with instruction to not work on distinct muscle >30 seconds and to stop if experiencing pain >4/10 or sudden sharp pain. Care taken to educate patient on not applying direct pressure to urethra during manual therapy. Patient can perform 25% PFM contraction for contract relax technique, PFM drop and/or diaphragmatic breathing to improve relaxation. and Patient education on role and use of vaginal lubricant and/or moisturizer in order to improve tissue health and decrease vaginal/vulvar irritation. TA Educational Topic Notes Date Completed   Pathology/Anatomy/PFM Function  5/13/21   Bladder health education     Urinary urge suppression     The knack     Voiding strategies     Bowel/Bladder log     Bowel health education     Constipation care     Diarrhea/Fecal leakage     Colonic massage     Toilet positioning     Defecation dynamics     Sources of fiber     Return to intercourse/Dilator training superficial PFM stretch w/ thumb 6/9/21   Sexual positioning     Lubricants/vaginal moisturizers Use of topical to vulvar/vestibule area as able 6/9/21   Vaginal irritants     Body mechanics     Posture/ergonomics     Diaphragmatic breathing     Resources and technology     Patient education Role and use of vaginal valium 6/28/21     MANUAL THERAPY: (30 minutes): Joint mobilization and Soft tissue mobilization was utilized and necessary because of the patient's restricted joint motion, painful spasm and restricted motion of soft tissue. Date Type Location Comments   7/22/2021 Internal assessment/treatment Via vaginal canal Single digit MT to all PFM    STM adductor Skin rolling into ischiorectal fossa; pudendal nerve glide- inferior rectal and perineal and dorsal n.  To clitoris branches  Inferior rectal w/ bridging    Joint mobilization Thoracic Grade 2/3    STM Lumbar spine QL                     (Used abbreviations: MET - muscle energy technique; SCS- Strain counter strain; CTM-Connective tissue mobilizations; CR- Contract/Relax; SP- Sustained pressure; PIT- Positional inhibition techniques; STM Soft -tissue mobilization; MM- Myofascial mobilization; TrP-Trigger point release; IASTM- Instrument assisted soft tissue mobilizations, TDN-Trigger point dry needling    [employer] PortalAccess Code: [email or web address]  URL: [email or web address]  Date: 07/18/2021  Prepared by: Julito Mathew    Exercises  Supine Posterior Pelvic Tilt - 1 x daily - 7 x weekly - 1 sets - 10 reps - 5 hold  Supine Transversus Abdominis Bracing - Hands on Stomach - 2 x daily - 7 x weekly - 10 reps - 10 hold  Supine Hip Adduction Isometric with Ball - 2 x daily - 7 x weekly - 3 sets - 10 reps  Hooklying Clamshell with Resistance - 2 x daily - 7 x weekly - 2 sets - 10 reps  Prone Press Up on Elbows - 1 x daily - 7 x weekly - 5 reps - 5 hold    Treatment/Session Summary:    · Response to Treatment: Pt with continued moderate levator ani and coccygeus tension noted with palpation. Tenderness with manual therapy to R side PFM (all layers) > L side, however improving since start of PT. Tolerated treatment well and without guarding present. Good activation of TA today requiring occasional verbal cuing for maintaining and coordination of breath with core engagement and movement. Will continue to benefit from skilled PT in order to reduce pain and improve core support/function. · Communication/Consultation:  None today  · Equipment provided today:  None today  · Recommendations/Intent for next treatment session: Next visit will focus on manual therapy, core strengthening, spine mobility.   Total Treatment Billable Duration: 25 minutes TE, 30 minutes MT  PT Patient Time In/Time Out  Time In: 0900  Time Out: 1000  Jose Seo PT, DPT     Future Appointments   Date Time Provider Bridget Venegas   7/23/2021 10:30 AM Tavo Cuevas PT [facility]   7/27/2021 11:15 AM Uriah Baumann, PT [facility]   7/28/2021 11:00 AM Samantha Raza PT, DPT [facility]   8/5/2021 10:30 AM Tavo CEJA PT [facility]   10/5/2021  1:45 PM MD EDUIN Venegas

## 2021-07-23 ENCOUNTER — HOSPITAL ENCOUNTER (OUTPATIENT)
Dept: PHYSICAL THERAPY | Age: 61
Discharge: HOME | End: 2021-07-23
Payer: COMMERCIAL

## 2021-07-23 PROCEDURE — 97140 MANUAL THERAPY 1/> REGIONS: CPT

## 2021-07-23 PROCEDURE — 97110 THERAPEUTIC EXERCISES: CPT

## 2021-07-23 NOTE — PROGRESS NOTES
Reginaldo Mcgowan  : 1960  Primary: Eva Diaz Of Emmanuelle Acuna*  Secondary:  [facility]  [facility] [address], [place] [address]  [phone]   [phone]      OUTPATIENT PHYSICAL THERAPY: Daily Treatment Note 2021   Visit Count:  9  ICD-10: Treatment Diagnosis: Lack of coordination (muscle incoordination) (R27.8), Pelvic floor dysfunction in female (M62.98), Stress incontinence (female) (male) (N39.3), Low back pain (M54.5), Pelvic and perineal pain (R10.2) and Myalgia (M79.1)  Precautions/Allergies:   Axid [nizatidine], Other medication, Simcor [niacin-simvastatin], Sulfa (sulfonamide antibiotics), Crestor [rosuvastatin], Erythromycin, and Shellfish containing products  TREATMENT PLAN:  Effective Dates: 2021 TO 8/10/2021 (90 days). Frequency/Duration: 1-2 time a week for 90 Day(s) MEDICAL/REFERRING DIAGNOSIS:  PFD (pelvic floor dysfunction) [M62.89]  DATE OF ONSET: chronic  REFERRING PHYSICIAN: Damir Keller MD MD Orders: Evaluate and treat  Return MD Appointment: 21     Pre-treatment Symptoms/Complaints:  Low back pain this morning persis  Pain: Initial: Pain Intensity 1: 3  Pain Location 1: Spine, lumbar   3/10 (vulvar) Post Session: 2/10 lumbart   Medications Last Reviewed:  2021  Updated Objective Findings:  Tenderness R>L w/ dorsal n to clitoria nn glides   TREATMENT:   THERAPEUTIC EXERCISE: (30 minutes):  Exercises per grid below to improve mobility, strength and coordination. Required minimal visual and verbal cues to promote proper body alignment, promote proper body posture and promote proper body breathing techniques. Progressed resistance, range, repetitions and complexity of movement as indicated.   TE= therapeutic exercise, TA= therapeutic activity, MT= manual therapy, NE= neuro re-education   Date:  21 Date:  21 Date:  21 Date:  21 Date:   21 Date:  21 Date:  21 Activity/Exercise Parameters         PFM coordination Drops with digital palpation Drops with digital palpation; reviewed and re instructed to improve understanding and performance        Diaphragmatic breathing          Adductor stretch reviewed         Happy baby stretch reviewed         Piriformis strech reviewed  Figure 4 stretch demo   3x30s, crossbody    HEP See below  Superficial self stretch  Stretching after strengthening Prone on elbows as needed to reduce pain 5'     Cat/cow  x10         Memo Wilson pose x30s         Sciatic nerve glide x10         Single knee to chest stretch  5 minutes (knee bent and straight)   3 x 30 secs     Prone on elbows    x10 with gentle OP 10x x10    MET    R hamstring/L quad to improve pelvic alignment 5' for anterior rotation correction  Shot gun   Hip abduction    X10, isometric 2 x 10 lime TB  2 x 10 lime TB   Hip adduction    X10, isometric 2 x 10 with ball  2 x 10 with ball   Ta bracing     8' 5x10s    Dead bug     Arms only 2 x 10 Arms only 2 x 10 2 x 10   Supine march     1 x 10 x10 2 x 10   Functional assessment     10'     Thoracic rotation/open book      x10 B    Bridge       X12, w/ cuing TA and exhale With ball and TB abd  10 x ea     THERAPEUTIC ACTIVITY: (0 minutes): Extensive functional activity training on internal self manual therapy with use of pad of thumb to increase patient's ability to relax pelvic floor musculature and increase independence with self management of condition. Patient provided with verbal and visual cues for correct performance of internal pelvic floor muscle release with instruction to not work on distinct muscle >30 seconds and to stop if experiencing pain >4/10 or sudden sharp pain. Care taken to educate patient on not applying direct pressure to urethra during manual therapy. Patient can perform 25% PFM contraction for contract relax technique, PFM drop and/or diaphragmatic breathing to improve relaxation.  and Patient education on role and use of vaginal lubricant and/or moisturizer in order to improve tissue health and decrease vaginal/vulvar irritation. TA Educational Topic Notes Date Completed   Pathology/Anatomy/PFM Function  5/13/21   Bladder health education     Urinary urge suppression     The knack     Voiding strategies     Bowel/Bladder log     Bowel health education     Constipation care     Diarrhea/Fecal leakage     Colonic massage     Toilet positioning     Defecation dynamics     Sources of fiber     Return to intercourse/Dilator training superficial PFM stretch w/ thumb 6/9/21   Sexual positioning     Lubricants/vaginal moisturizers Use of topical to vulvar/vestibule area as able 6/9/21   Vaginal irritants     Body mechanics     Posture/ergonomics     Diaphragmatic breathing     Resources and technology     Patient education Role and use of vaginal valium 6/28/21     MANUAL THERAPY: (10 minutes): Joint mobilization was utilized and necessary because of the patient's restricted joint motion and loss of articular motion. Date Type Location Comments   7/23/2021 Internal assessment/treatment Via vaginal canal Single digit MT to all PFM    STM adductor Skin rolling into ischiorectal fossa; pudendal nerve glide- inferior rectal and perineal and dorsal n. To clitoris branches  Inferior rectal w/ bridging (not performed)    Joint mobilization Lumbar  Rotation Grade 3-4 bilat, LAT bilat    STM Lumbar spine QL (not performed)                     (Used abbreviations: MET - muscle energy technique; SCS- Strain counter strain; CTM-Connective tissue mobilizations; CR- Contract/Relax; SP- Sustained pressure; PIT- Positional inhibition techniques; STM Soft -tissue mobilization; MM- Myofascial mobilization; TrP-Trigger point release; IASTM- Instrument assisted soft tissue mobilizations, TDN-Trigger point dry needling    [employer] PortalAccess Code: [email or web address]  URL: [email or web address]  Date: 07/18/2021  Prepared by: Kana Johnson Desdune-Richard    Exercises  Supine Posterior Pelvic Tilt - 1 x daily - 7 x weekly - 1 sets - 10 reps - 5 hold  Supine Transversus Abdominis Bracing - Hands on Stomach - 2 x daily - 7 x weekly - 10 reps - 10 hold  Supine Hip Adduction Isometric with Ball - 2 x daily - 7 x weekly - 3 sets - 10 reps  Hooklying Clamshell with Resistance - 2 x daily - 7 x weekly - 2 sets - 10 reps  Prone Press Up on Elbows - 1 x daily - 7 x weekly - 5 reps - 5 hold    Treatment/Session Summary:    · Response to Treatment: Pt tolerated the progression of exercises well today with good Ta contraction  Throughout. Gently cues with breathing required  · Communication/Consultation:  None today  · Equipment provided today:  None today  · Recommendations/Intent for next treatment session: Next visit will focus on manual therapy, core strengthening, spine mobility.   Total Treatment Billable Duration: 30 minutes TE, 15 minutes MT  PT Patient Time In/Time Out  Time In: 1030  Time Out: 1632 Satish Dupont PT     Future Appointments   Date Time Provider Bridget Anguianoi   7/27/2021 11:15 AM Wandy Kong PT [facility]   7/28/2021 11:00 AM Acosta Husain PT, DPT [facility]   8/5/2021 10:30 AM Wandy CEJA PT [facility]   10/5/2021  1:45 PM MD EDUIN Escobedo

## 2021-07-27 ENCOUNTER — HOSPITAL ENCOUNTER (OUTPATIENT)
Dept: PHYSICAL THERAPY | Age: 61
Discharge: HOME | End: 2021-07-27
Payer: COMMERCIAL

## 2021-07-27 PROCEDURE — 97140 MANUAL THERAPY 1/> REGIONS: CPT

## 2021-07-27 PROCEDURE — 97110 THERAPEUTIC EXERCISES: CPT

## 2021-07-27 NOTE — PROGRESS NOTES
To Castañeda  : 1960  Primary: Guanako Robertson Of Emmanuelle Acuna*  Secondary:  [facility]  [facility] [address], [place] [address]  [phone]   [phone]      OUTPATIENT PHYSICAL THERAPY: Daily Treatment Note 2021   Visit Count:  10  ICD-10: Treatment Diagnosis: Lack of coordination (muscle incoordination) (R27.8), Pelvic floor dysfunction in female (M62.98), Stress incontinence (female) (male) (N39.3), Low back pain (M54.5), Pelvic and perineal pain (R10.2) and Myalgia (M79.1)  Precautions/Allergies:   Axid [nizatidine], Other medication, Simcor [niacin-simvastatin], Sulfa (sulfonamide antibiotics), Crestor [rosuvastatin], Erythromycin, and Shellfish containing products  TREATMENT PLAN:  Effective Dates: 2021 TO 8/10/2021 (90 days). Frequency/Duration: 1-2 time a week for 90 Day(s) MEDICAL/REFERRING DIAGNOSIS:  PFD (pelvic floor dysfunction) [M62.89]  DATE OF ONSET: chronic  REFERRING PHYSICIAN: Tania Whitt MD MD Orders: Evaluate and treat  Return MD Appointment: 21     Pre-treatment Symptoms/Complaints:  Low back pain reported this morning   Pain: Initial: Pain Intensity 1: 5  Pain Location 1: Spine, lumbar   3/10 (vulvar) Post Session: 2-3/10 lumbar   Medications Last Reviewed:  2021  Updated Objective Findings:  Tenderness R>L w/ dorsal n to clitoria nn glides   TREATMENT:   THERAPEUTIC EXERCISE: (30 minutes):  Exercises per grid below to improve mobility, strength and coordination. Required minimal visual and verbal cues to promote proper body alignment, promote proper body posture and promote proper body breathing techniques. Progressed resistance, range, repetitions and complexity of movement as indicated.   TE= therapeutic exercise, TA= therapeutic activity, MT= manual therapy, NE= neuro re-education   Date:  21 Date:  21 Date:  21 Date:   21 Date:  21 Date:  21 Date:  21 Activity/Exercise          PFM coordination Drops with digital palpation; reviewed and re instructed to improve understanding and performance         Diaphragmatic breathing          Adductor stretch          Happy baby stretch          Piriformis strech  Figure 4 stretch demo   3x30s, crossbody     HEP  Stretching after strengthening Prone on elbows as needed to reduce pain 5'   10'   Cat/cow           Racquel pose          Sciatic nerve glide          Single knee to chest stretch 5 minutes (knee bent and straight)   3 x 30 secs      Prone on elbows   x10 with gentle OP 10x x10     MET   R hamstring/L quad to improve pelvic alignment 5' for anterior rotation correction  Shot gun    Hip abduction   X10, isometric 2 x 10 lime TB  2 x 10 lime TB    Hip adduction   X10, isometric 2 x 10 with ball  2 x 10 with ball    Ta bracing    8' 5x10s     Dead bug    Arms only 2 x 10 Arms only 2 x 10 2 x 10 2 x 10   Supine march    1 x 10 x10 2 x 10 2 x 10   Functional assessment    10'      Thoracic rotation/open book     x10 B     Bridge      X12, w/ cuing TA and exhale With ball and TB abd  10 x ea 2 x 10   SLR with TA       2 x 10 B   Clams sidelying       2 x 10 B   Hip abduction       2 x 10 B     THERAPEUTIC ACTIVITY: (0 minutes): Extensive functional activity training on internal self manual therapy with use of pad of thumb to increase patient's ability to relax pelvic floor musculature and increase independence with self management of condition. Patient provided with verbal and visual cues for correct performance of internal pelvic floor muscle release with instruction to not work on distinct muscle >30 seconds and to stop if experiencing pain >4/10 or sudden sharp pain. Care taken to educate patient on not applying direct pressure to urethra during manual therapy. Patient can perform 25% PFM contraction for contract relax technique, PFM drop and/or diaphragmatic breathing to improve relaxation.  and Patient education on role and use of vaginal lubricant and/or moisturizer in order to improve tissue health and decrease vaginal/vulvar irritation. TA Educational Topic Notes Date Completed   Pathology/Anatomy/PFM Function  5/13/21   Bladder health education     Urinary urge suppression     The knack     Voiding strategies     Bowel/Bladder log     Bowel health education     Constipation care     Diarrhea/Fecal leakage     Colonic massage     Toilet positioning     Defecation dynamics     Sources of fiber     Return to intercourse/Dilator training superficial PFM stretch w/ thumb 6/9/21   Sexual positioning     Lubricants/vaginal moisturizers Use of topical to vulvar/vestibule area as able 6/9/21   Vaginal irritants     Body mechanics     Posture/ergonomics     Diaphragmatic breathing     Resources and technology     Patient education Role and use of vaginal valium 6/28/21     MANUAL THERAPY: (10 minutes): Joint mobilization was utilized and necessary because of the patient's restricted joint motion and loss of articular motion. Date Type Location Comments   7/27/2021 Internal assessment/treatment Via vaginal canal Single digit MT to all PFM (not performed)    STM adductor Skin rolling into ischiorectal fossa; pudendal nerve glide- inferior rectal and perineal and dorsal n. To clitoris branches  Inferior rectal w/ bridging (not performed)    Joint mobilization Lumbar   LAT bilat    STM Lumbar spine QL (not performed)                     (Used abbreviations: MET - muscle energy technique; SCS- Strain counter strain; CTM-Connective tissue mobilizations; CR- Contract/Relax; SP- Sustained pressure; PIT- Positional inhibition techniques; STM Soft -tissue mobilization; MM- Myofascial mobilization; TrP-Trigger point release; IASTM- Instrument assisted soft tissue mobilizations, TDN-Trigger point dry needling    [employer] PortalAccess Code: [email or web address] Code: [email or web address]  URL: [email or web address]  Date: 07/27/2021  Prepared by: Cabool Sluder    Exercises  Supine Posterior Pelvic Tilt - 1 x daily - 7 x weekly - 1 sets - 10 reps - 5 hold  Prone Press Up on Elbows - 1 x daily - 7 x weekly - 5 reps - 5 hold  Child's Pose Stretch - 1 x daily - 7 x weekly - 5 reps - 5 hold  Sidelying Thoracic Rotation with Open Book - 1 x daily - 7 x weekly - 1 sets - 10 reps - 5 hold  Supine Transversus Abdominis Bracing - Hands on Stomach - 2 x daily - 7 x weekly - 10 reps - 10 hold  Supine Hip Adduction Isometric with Ball - 2 x daily - 7 x weekly - 3 sets - 10 reps  Hooklying Clamshell with Resistance - 2 x daily - 7 x weekly - 2 sets - 10 reps  Supine March - 1 x daily - 7 x weekly - 2 sets - 10 reps  Dead Bug - 1 x daily - 7 x weekly - 2 sets - 10 reps  Supine Bridge with Resistance Band - 1 x daily - 7 x weekly - 3 sets - 10 reps  Active Straight Leg Raise with Quad Set - 1 x daily - 7 x weekly - 2 sets - 10 reps  Beginner Clam - 1 x daily - 7 x weekly - 2 sets - 10 reps  Sidelying Hip Abduction - 1 x daily - 7 x weekly - 2 sets - 10 reps  Squat with Chair Touch - 1 x daily - 7 x weekly - 2 sets - 10 reps    Treatment/Session Summary:    · Response to Treatment: Pt tolerated the progression of core exercise well today. · Communication/Consultation:  Encor[employer] resuiming a walking program, aquatic based exercises, HEP updated  · Equipment provided today:  HEP  · Recommendations/Intent for next treatment session: Next visit will focus on manual therapy, core strengthening, spine mobility.   Total Treatment Billable Duration: 30 minutes TE, 15 minutes MT  PT Patient Time In/Time Out  Time In: 1115  Time Out: [address], PT     Future Appointments   Date Time Provider Bridget Venegas   7/28/2021 11:00 AM Yary Khan PT, DPT [facility]   8/5/2021 10:30 AM Uriah Baumann, PT [facility]   10/5/2021  1:45 PM MD EDUIN Bhardwaj

## 2021-07-28 ENCOUNTER — HOSPITAL ENCOUNTER (OUTPATIENT)
Dept: PHYSICAL THERAPY | Age: 61
Discharge: HOME | End: 2021-07-28
Payer: COMMERCIAL

## 2021-07-28 PROCEDURE — 97140 MANUAL THERAPY 1/> REGIONS: CPT

## 2021-07-28 PROCEDURE — 97530 THERAPEUTIC ACTIVITIES: CPT

## 2021-07-28 PROCEDURE — 97110 THERAPEUTIC EXERCISES: CPT

## 2021-07-28 NOTE — PROGRESS NOTES
Artur Nunez  : 1960  Primary: Castillo Loosen Of Emmanuelle Acuna*  Secondary:  [facility] at [place] [address], [address], [place] [address]  [phone]   [phone]      OUTPATIENT PHYSICAL THERAPY: Daily Treatment Note 2021   Visit Count:  11  ICD-10: Treatment Diagnosis: Lack of coordination (muscle incoordination) (R27.8), Pelvic floor dysfunction in female (M62.98), Stress incontinence (female) (male) (N39.3), Low back pain (M54.5), Pelvic and perineal pain (R10.2) and Myalgia (M79.1)  Precautions/Allergies:   Axid [nizatidine], Other medication, Simcor [niacin-simvastatin], Sulfa (sulfonamide antibiotics), Crestor [rosuvastatin], Erythromycin, and Shellfish containing products  TREATMENT PLAN:  Effective Dates: 2021 TO 8/10/2021 (90 days). Frequency/Duration: 1-2 time a week for 90 Day(s) MEDICAL/REFERRING DIAGNOSIS:  PFD (pelvic floor dysfunction) [M62.89]  DATE OF ONSET: chronic  REFERRING PHYSICIAN: Agustina Rivas MD MD Orders: Evaluate and treat  Return MD Appointment: 21     Pre-treatment Symptoms/Complaints:  Vulvar and LBP about the same today. Does feel that combined ortho and PFPT have  Pain: Initial: Pain Intensity 1: 3  Pain Location 1: Vulva    Post Session: 2/10    Medications Last Reviewed:  2021  Updated Objective Findings:  tendernesss with R side nerve glide/STM labia and anterior and middle branches pudenal n   TREATMENT:   THERAPEUTIC EXERCISE: (10 minutes):  Exercises per grid below to improve mobility, strength and coordination. Required minimal visual and verbal cues to promote proper body alignment, promote proper body posture and promote proper body breathing techniques. Progressed resistance, range, repetitions and complexity of movement as indicated.   TE= therapeutic exercise, TA= therapeutic activity, MT= manual therapy, NE= neuro re-education   Date:  21 Date:  21 Date:   21 Date:  21 Date:  7/23/21 Date:  7/27/21 Date:  7/28/21   Activity/Exercise          PFM coordination          Diaphragmatic breathing          Adductor stretch          Happy baby stretch          Piriformis strech Figure 4 stretch demo   3x30s, crossbody      HEP Stretching after strengthening Prone on elbows as needed to reduce pain 5'   10' 10' - Review of POC, progress toward goals   Cat/cow           Racquel pose          Sciatic nerve glide          Single knee to chest stretch   3 x 30 secs       Prone on elbows  x10 with gentle OP 10x x10      MET  R hamstring/L quad to improve pelvic alignment 5' for anterior rotation correction  Shot gun     Hip abduction  X10, isometric 2 x 10 lime TB  2 x 10 lime TB     Hip adduction  X10, isometric 2 x 10 with ball  2 x 10 with ball     Ta bracing   8' 5x10s      Dead bug   Arms only 2 x 10 Arms only 2 x 10 2 x 10 2 x 10    Supine march   1 x 10 x10 2 x 10 2 x 10    Functional assessment   10'       Thoracic rotation/open book    x10 B      Bridge     X12, w/ cuing TA and exhale With ball and TB abd  10 x ea 2 x 10    SLR with TA      2 x 10 B    Clams sidelying      2 x 10 B    Hip abduction      2 x 10 B      THERAPEUTIC ACTIVITY: (15 minutes): Extensive functional activity training on internal self manual therapy with use of dilator to increase patient's ability to relax pelvic floor musculature and increase independence with self management of condition. Patient provided with verbal and visual cues for correct performance of internal pelvic floor muscle release with instruction to not work on distinct muscle >30 seconds and to stop if experiencing pain >4/10 or sudden sharp pain. Care taken to educate patient on not applying direct pressure to urethra during manual therapy. Patient can perform 25% PFM contraction for contract relax technique, PFM drop and/or diaphragmatic breathing to improve relaxation.   TA Educational Topic Notes Date Completed   Pathology/Anatomy/PFM Function  5/13/21   Bladder health education     Urinary urge suppression     The ivelisse     Voiding strategies     Bowel/Bladder log     Bowel health education     Constipation care     Diarrhea/Fecal leakage     Colonic massage     Toilet positioning     Defecation dynamics     Sources of fiber     Return to intercourse/Dilator training superficial PFM stretch w/ thumb  Dilator training education 6/9/21 7/28/21   Sexual positioning     Lubricants/vaginal moisturizers Use of topical to vulvar/vestibule area as able 6/9/21   Vaginal irritants     Body mechanics     Posture/ergonomics     Diaphragmatic breathing     Resources and technology     Patient education Role and use of vaginal valium 6/28/21     MANUAL THERAPY: (30 minutes): Soft tissue mobilization was utilized and necessary because of the patient's pain and nerve/soft tissue mobility. Date Type Location Comments   7/28/2021 Internal assessment/treatment Via vaginal canal Single digit MT to all PFM (not performed)    STM adductor Skin rolling into ischiorectal fossa; pudendal nerve glide- inferior rectal and perineal and dorsal n. To clitoris branches  Inferior rectal w/ bridging (not performed)    Joint mobilization Lumbar   LAT bilat    STM Lumbar spine QL (not performed)                     (Used abbreviations: MET - muscle energy technique; SCS- Strain counter strain; CTM-Connective tissue mobilizations; CR- Contract/Relax; SP- Sustained pressure; PIT- Positional inhibition techniques; STM Soft -tissue mobilization; MM- Myofascial mobilization; TrP-Trigger point release; IASTM- Instrument assisted soft tissue mobilizations, TDN-Trigger point dry needling    [employer] PortalAccess Code: [email or web address] Code: [email or web address]  URL: [email or web address]  Date: 07/27/2021  Prepared by: Nancy Ignacio    Exercises  Supine Posterior Pelvic Tilt - 1 x daily - 7 x weekly - 1 sets - 10 reps - 5 hold  Prone Press Up on Elbows - 1 x daily - 7 x weekly - 5 reps - 5 hold  Child's Pose Stretch - 1 x daily - 7 x weekly - 5 reps - 5 hold  Sidelying Thoracic Rotation with Open Book - 1 x daily - 7 x weekly - 1 sets - 10 reps - 5 hold  Supine Transversus Abdominis Bracing - Hands on Stomach - 2 x daily - 7 x weekly - 10 reps - 10 hold  Supine Hip Adduction Isometric with Ball - 2 x daily - 7 x weekly - 3 sets - 10 reps  Hooklying Clamshell with Resistance - 2 x daily - 7 x weekly - 2 sets - 10 reps  Supine March - 1 x daily - 7 x weekly - 2 sets - 10 reps  Dead Bug - 1 x daily - 7 x weekly - 2 sets - 10 reps  Supine Bridge with Resistance Band - 1 x daily - 7 x weekly - 3 sets - 10 reps  Active Straight Leg Raise with Quad Set - 1 x daily - 7 x weekly - 2 sets - 10 reps  Beginner Clam - 1 x daily - 7 x weekly - 2 sets - 10 reps  Sidelying Hip Abduction - 1 x daily - 7 x weekly - 2 sets - 10 reps  Squat with Chair Touch - 1 x daily - 7 x weekly - 2 sets - 10 reps    Treatment/Session Summary:    · Response to Treatment: Pt continues to progress well with manual therapy and pain. Notes discomfort/pain with clitoral and perineal branches of pudendal nn. Discussed dilator training and initiating at home to improve independent PFM management. Discussed progress and POC; due to surgery for hand at end of month patient would like to try continue independently with management of spine and pelvic symptom in order to conserve therapy visits for post op recovery. · Communication/Consultation:  None today  · Equipment provided today:  None today  · Recommendations/Intent for next treatment session: Next visit will focus on solidifying HEP for plan to D/C in 1-2 visits.   Total Treatment Billable Duration: 10 minutes TE, 15 minutes TA, 30 minutes MT  PT Patient Time In/Time Out  Time In: 1100  Time Out: 600 [facility], PT, DPT     Future Appointments   Date Time Provider Bridget Venegas   8/5/2021 10:30 AM Missael Rodriguez, PT [facility]   8/12/2021 2:00 PM Beverly Castellano, DPT [facility]   10/5/2021  1:45 PM MD EDUIN Amaya

## 2021-08-05 ENCOUNTER — HOSPITAL ENCOUNTER (OUTPATIENT)
Dept: PHYSICAL THERAPY | Age: 61
Discharge: HOME | End: 2021-08-05

## 2021-08-05 NOTE — PROGRESS NOTES
To Castañeda  : 1960  Primary: Guanako Robertson Of [place] Acuna*  [facility]:  [facility] [address], [place] [address]  [phone]   [phone]      OUTPATIENT DAILY NOTE    NAME/AGE/GENDER: To Castañeda is a 59 y.o. female. DATE: 2021    Ms. Rodgers Obdulio for today's appointment due to covid exposure.     Cb Schmidt PT   Future Appointments   Date Time Provider Bridget Venegas   2021 10:30 AM Gerry Rollins PT [facility]   2021  2:00 PM Angelica Subramanian PT, DPT [facility]   10/5/2021  1:45 PM MD EDUIN Cid

## 2021-08-12 ENCOUNTER — HOSPITAL ENCOUNTER (OUTPATIENT)
Dept: PHYSICAL THERAPY | Age: 61
Discharge: HOME | End: 2021-08-12

## 2021-08-12 NOTE — PROGRESS NOTES
Ricardo Kamara  : 1960  Primary: Sebastian Vega Of Emmanuelle Acuna*  Secondary:  [facility] at [place] [address], [address], [place] [address]  [phone]   [phone]        OUTPATIENT DAILY NOTE    NAME/AGE/GENDER: Ricardo Kamara is a 59 y.o. female. DATE: 2021    Ms. Dickersonjosette Chan for today's appointment due to illness. Declined to R/S at this time.     Jacey Tamayo, PT, DPT

## 2021-08-19 NOTE — THERAPY DISCHARGE
Brody Haas  : 1960  Primary: Manan Caicedo [facility]*  Secondary:  [facility]  [facility], [address], [place] [address]  [phone]   [phone]        OUTPATIENT PHYSICAL THERAPY:Discharge Summary and Discontinuation Summary 2021   ICD-10: Treatment Diagnosis: Lack of coordination (muscle incoordination) (R27.8), Pelvic floor dysfunction in female (M62.98), Stress incontinence (female) (male) (N39.3), Low back pain (M54.5 ), Pelvic and perineal pain (R10.2) and Myalgia (M79.1)  Precautions/Allergies:   Axid [nizatidine], Other medication, Simcor [niacin-simvastatin], Sulfa (sulfonamide antibiotics), Crestor [rosuvastatin], Erythromycin, and Shellfish containing products  TREATMENT PLAN:  Effective Dates: 2021 TO 8/10/2021 (90 days). Frequency/Duration: 1-2 time a week for 90 Day(s) MEDICAL/REFERRING DIAGNOSIS:  PFD (pelvic floor dysfunction) [M62.89]  DATE OF ONSET: chronic  REFERRING PHYSICIAN: Jackie Canales MD MD Orders: Evaluate and treat  Return MD Appointment: UNKNOWN   DISCHARGE SUMMARY: Ms. Alessia Ley has been seen in skilled PT from 21 to 21. Patient has attended 11 out of 13 scheduled visits, with 2 cancellation(s) and 0 no shows. Treatment has emphasized manual therapy, spinal and pelvic mobility, core strength/endurance, as well as vulvar and sexual health education in order to minimize pain and improve sitting tolerance. Patient responded well to therapy, with improvements in pain intensity and frequency. Pt would like to continue with HEP independently at this time in order to conserve PT benefits for upcoming surgical procedure. Pt has achieved goals as indicated below and all questions have been answered to their satisfaction. Pt was invited to call with any further questions or concerns as needed.     INITIAL ASSESSMENT: Brody Haas presents with musculoskeletal limitations including pelvic floor muscle (PFM) tension, reduced PFM Range of Motion (ROM), increased tenderness to palpation of the PFM, reduced hip ROM, reduced coordination and awareness of PFM, reduced hip strength, poor diaphragm excursion and poor abdominal strength. These limitations are impairing the patient's ability to properly coordinate perineal elevation and descent for normalized PFM function, contributing to sexual dysfunction and low back pain. As a result, she is limited in her/his ability to participate in gynecological exams, intercourse and house hold tasks. GOALS: (Goals have been discussed and agreed upon with patient.)  Short-Term Functional Goals: Time Frame: 6 weeks  1. Pt will demonstrate I with basic PFM HEP to improve awareness, coordination, and timing of PFM. (MET)  2. Pt will demonstrate ability to perform diaphragmatic breathing in multiple positions to assist in pelvic floor tension reduction. (MET)  3. Pt will verbalize understanding and demonstrate postural adjustments which facilitate lengthening and reduce overall pelvic floor muscle activity. (MET)  Discharge Goals: Time Frame: 12 weeks  1. Pt will demonstrate ability to voluntarily contract the pelvic floor muscles prior to a cough or sneeze for decreased leakage during increases in intra-abdominal pressure. (UNABLE TO ASSESS 8/12/21)  2. Pt will improve outcome score by 50%. (UNABLE TO ASSESS 8/12/21)   3. Pt will be independent in a home dilator and/or graded exposure program, to be performed daily, in order to reduce pain and improved tolerance of tampon use, gynecological screening, and/or sexual intercourse. (UNABLE TO ASSESS 8/12/21)  4.  Pt will demonstrate independence with a home exercise program for aerobic conditioning, core stabilization, and general mobility for independent management of symptoms. (UNABLE TO ASSESS 8/12/21)    OUTCOME MEASURE:   Tool Used: Pelvic Floor Impact Questionnaire--short form 7 (PFIQ-7)   Score:  Initial: 5/13/21  · Bladder or Urine: 71.43/100  · Bowel or Rectum: 71.43/100  · Vagina or Pelvis: 80.95/100 Most Recent: X (Date: -- )  · Bladder or Urine: X  · Bowel or Rectum: X  · Vagina or Pelvis: X   Interpretation of Score: Each of the 7 sections is scored on a scale from 0-3; 0 representing \"Not at all\", 3 representing \"Quite a bit\". The mean value is taken from all the answered items, then multiplied by 100 to obtain the scale score, ranging from 0-100. This process is repeated for each column representing bowel, bladder, and pelvic pain. Rehabilitation Potential For Stated Goals: Good  Regarding [facility] therapy, I certify that the treatment plan above will be carried out by a therapist or under their direction. Thank you for this referral,  Cailin Hart, PT, DPT     Referring Physician Signature: Manan Pino MD No Signature is Required for this note.

## 2021-09-20 ENCOUNTER — OFFICE VISIT (OUTPATIENT)
Dept: URGENT CARE | Facility: CLINIC | Age: 61
End: 2021-09-20
Payer: COMMERCIAL

## 2021-09-20 ENCOUNTER — HOSPITAL ENCOUNTER (OUTPATIENT)
Facility: MEDICAL CENTER | Age: 61
End: 2021-09-20
Attending: FAMILY MEDICINE
Payer: COMMERCIAL

## 2021-09-20 VITALS
WEIGHT: 162 LBS | BODY MASS INDEX: 30.58 KG/M2 | HEIGHT: 61 IN | HEART RATE: 65 BPM | DIASTOLIC BLOOD PRESSURE: 86 MMHG | OXYGEN SATURATION: 98 % | RESPIRATION RATE: 16 BRPM | SYSTOLIC BLOOD PRESSURE: 140 MMHG | TEMPERATURE: 98 F

## 2021-09-20 DIAGNOSIS — Z20.822 EXPOSURE TO COVID-19 VIRUS: ICD-10-CM

## 2021-09-20 DIAGNOSIS — R06.02 SHORTNESS OF BREATH: ICD-10-CM

## 2021-09-20 PROCEDURE — 99203 OFFICE O/P NEW LOW 30 MIN: CPT | Mod: CS | Performed by: FAMILY MEDICINE

## 2021-09-20 PROCEDURE — U0005 INFEC AGEN DETEC AMPLI PROBE: HCPCS

## 2021-09-20 PROCEDURE — U0003 INFECTIOUS AGENT DETECTION BY NUCLEIC ACID (DNA OR RNA); SEVERE ACUTE RESPIRATORY SYNDROME CORONAVIRUS 2 (SARS-COV-2) (CORONAVIRUS DISEASE [COVID-19]), AMPLIFIED PROBE TECHNIQUE, MAKING USE OF HIGH THROUGHPUT TECHNOLOGIES AS DESCRIBED BY CMS-2020-01-R: HCPCS

## 2021-09-20 RX ORDER — MECLIZINE HCL 12.5 MG/1
12.5 TABLET ORAL 3 TIMES DAILY PRN
COMMUNITY

## 2021-09-20 RX ORDER — CYCLOBENZAPRINE HCL 10 MG
10 TABLET ORAL 3 TIMES DAILY PRN
COMMUNITY

## 2021-09-20 RX ORDER — ONDANSETRON 4 MG/1
4 TABLET, ORALLY DISINTEGRATING ORAL EVERY 6 HOURS PRN
COMMUNITY

## 2021-09-20 RX ORDER — GABAPENTIN 100 MG/1
100 CAPSULE ORAL 3 TIMES DAILY
COMMUNITY

## 2021-09-20 ASSESSMENT — ENCOUNTER SYMPTOMS
VOMITING: 0
WEIGHT LOSS: 0
NAUSEA: 0
EYE DISCHARGE: 0
EYE REDNESS: 0
DIARRHEA: 0

## 2021-09-20 NOTE — PROGRESS NOTES
"Alina Salguero is a 61 y.o. female who presents with Coronavirus Screening (exposed for 3 to 5 days, shortness of breath)            Covid 19 exposure. Mild SOB thinks due to elevation change from [place]. History c19 infection 1/2021. c19 vaccinated. No limb swelling. No fever. No loss of taste or smell. No cough. No other aggravating or alleviating factors.        Review of Systems   Constitutional: Positive for malaise/fatigue. Negative for weight loss.   Eyes: Negative for discharge and redness.   Gastrointestinal: Negative for diarrhea, nausea and vomiting.   Musculoskeletal: Negative for joint pain.   Skin: Negative for itching and rash.              Objective     /86   Pulse 65   Temp 36.7 °C (98 °F)   Resp 16   Ht 1.549 m (5' 1\")   Wt 73.5 kg (162 lb)   SpO2 98%   BMI 30.61 kg/m²      Physical Exam  Constitutional:       General: She is not in acute distress.     Appearance: She is well-developed.   HENT:      Head: Normocephalic and atraumatic.      Nose: Nose normal. No congestion.   Eyes:      Conjunctiva/sclera: Conjunctivae normal.   Cardiovascular:      Rate and Rhythm: Normal rate and regular rhythm.      Heart sounds: Normal heart sounds. No murmur heard.     Pulmonary:      Effort: Pulmonary effort is normal.      Breath sounds: Normal breath sounds. No wheezing.   Skin:     General: Skin is warm and dry.      Findings: No rash.   Neurological:      Mental Status: She is alert and oriented to person, place, and time.                             Assessment & Plan         1. Shortness of breath  COVID/SARS CoV-2 PCR   2. Exposure to COVID-19 virus  COVID/SARS CoV-2 PCR     Differential diagnosis, natural history, supportive care, and indications for immediate follow-up discussed at length.              "

## 2021-09-21 DIAGNOSIS — R06.02 SHORTNESS OF BREATH: ICD-10-CM

## 2021-09-21 DIAGNOSIS — Z20.822 EXPOSURE TO COVID-19 VIRUS: ICD-10-CM

## 2021-09-21 LAB
COVID ORDER STATUS COVID19: NORMAL
SARS-COV-2 RNA RESP QL NAA+PROBE: NOTDETECTED
SPECIMEN SOURCE: NORMAL

## 2021-10-05 NOTE — H&P (VIEW-ONLY)
Orthopaedic Hand Surgery Note    Name: Jessie Manuel  Age: 59 y.o. YOB: 1960  Gender: female  MRN: [id number]    CC: Follow up for thumb Carpometacarpal Joint osteoarthritis    HPI: Patient is a 59 y.o. female who is here regarding follow up for thumb CMC osteoarthritis. On the last visit we provided a left thumb CMC joint steroid injection, the patient reports that the injections are no longer helping for a long period of time, they wear out after 4- 6 weeks. ROS/Meds/PSH/PMH/FH/SH: I personally reviewed the patients standard intake form. Pertinents are discussed in the HPI    Physical Examination:  General: Awake and alert. HEENT: Normocephalic, atraumatic  CV/Pulm: Breathing even and unlabored  Skin: No obvious rashes noted. Lymphatic: No obvious evidence of lymphedema or lymphadenopathy    Musculoskeletal:   Examination of the Left upper extremity demonstrates normal sensation in median, ulnar and radial distribution, negative carpal tunnel compression and Phalen test, cap refill < 5 seconds in all fingers. Mild prominence and instability of the base of the first metacarpal. CMC grind is positive for pain and crepitus. Thumb MCP joint hyperextends 55 degrees. No pain at the radial styloid. Imaging / Electrodiagnostic Tests:     Previous left hand x-ray was reviewed which demonstrates bone-on-bone arthritis of the [facility] joint, well-preserved STT joint, hyperextension at the MP joint    Assessment:   1. Arthritis of carpometacarpal (CMC) joint of left thumb        Plan:  We discussed the diagnosis and different treatment options. We discussed observation, day/night splinting, cortisone injection(s) and surgical reconstruction and the risks and benefits of all were clearly outlined. After discussing all options in detail the patient elects to proceed with left thumb trapeziectomy and suspension arthroplasty, left thumb MP joint capsulodesis for correction of hyperextension.      Patient voiced accordance and understanding of the information provided and the formulated plan. All questions were answered to the patient's satisfaction during the encounter. 4 This is a chronic illness/condition with exacerbation and progression  Treatment at this time: Elective major surgery with procedural risk factors     Patient understands risks and benefits of  LEFT THUMB TRAPEZIECTOMY AND SUSPENSION ARTHROPLASTY, LEFT THUMB MP JOINT CAPSULODESIS including but not limited to nerve injury, vessel injury, infection, failure to achieve desired results and possible need for additional surgery. Patient understands and wishes to proceed with surgery.      On Exam:   The patient is alert and oriented; ;   Lung auscultation is clear bilaterally   Heart has RRR without murmurs      Solange Kuhn MD  Orthopaedic Surgery  10/05/21  1:57 PM

## 2021-10-10 ENCOUNTER — ANESTHESIA EVENT (OUTPATIENT)
Dept: SURGERY | Age: 61
End: 2021-10-10
Payer: COMMERCIAL

## 2021-10-10 PROBLEM — M25.342 CHRONIC INSTABILITY OF METACARPOPHALANGEAL JOINT OF LEFT THUMB: Status: ACTIVE | Noted: 2021-10-10

## 2021-10-10 PROBLEM — M18.12 ARTHRITIS OF CARPOMETACARPAL (CMC) JOINT OF LEFT THUMB: Status: ACTIVE | Noted: 2021-10-10

## 2021-10-11 ENCOUNTER — ANESTHESIA (OUTPATIENT)
Dept: SURGERY | Age: 61
End: 2021-10-11
Payer: COMMERCIAL

## 2021-10-11 ENCOUNTER — HOSPITAL ENCOUNTER (OUTPATIENT)
Age: 61
Setting detail: OUTPATIENT SURGERY
Discharge: HOME | End: 2021-10-11
Attending: ORTHOPAEDIC SURGERY | Admitting: ORTHOPAEDIC SURGERY
Payer: COMMERCIAL

## 2021-10-11 ENCOUNTER — APPOINTMENT (OUTPATIENT)
Dept: GENERAL RADIOLOGY | Age: 61
End: 2021-10-11
Attending: ORTHOPAEDIC SURGERY
Payer: COMMERCIAL

## 2021-10-11 VITALS
WEIGHT: 161 LBS | OXYGEN SATURATION: 96 % | DIASTOLIC BLOOD PRESSURE: 68 MMHG | RESPIRATION RATE: 16 BRPM | HEART RATE: 59 BPM | HEIGHT: 61 IN | SYSTOLIC BLOOD PRESSURE: 145 MMHG | BODY MASS INDEX: 30.4 KG/M2 | TEMPERATURE: 97.8 F

## 2021-10-11 PROCEDURE — 74011000250 HC RX REV CODE- 250: Performed by: NURSE ANESTHETIST, CERTIFIED REGISTERED

## 2021-10-11 PROCEDURE — C1713 ANCHOR/SCREW BN/BN,TIS/BN: HCPCS | Performed by: ORTHOPAEDIC SURGERY

## 2021-10-11 PROCEDURE — 77030003602 HC NDL NRV BLK BBMI -B: Performed by: NURSE ANESTHETIST, CERTIFIED REGISTERED

## 2021-10-11 PROCEDURE — 77030002986 HC SUT PROL J&J -A: Performed by: ORTHOPAEDIC SURGERY

## 2021-10-11 PROCEDURE — 25447 ARTHRP NTRCRP/CRP/MTCR NTRPS: CPT | Performed by: ORTHOPAEDIC SURGERY

## 2021-10-11 PROCEDURE — 2709999900 HC NON-CHARGEABLE SUPPLY: Performed by: ORTHOPAEDIC SURGERY

## 2021-10-11 PROCEDURE — 76060000034 HC ANESTHESIA 1.5 TO 2 HR: Performed by: ORTHOPAEDIC SURGERY

## 2021-10-11 PROCEDURE — 77030002912 HC SUT ETHBND J&J -A: Performed by: ORTHOPAEDIC SURGERY

## 2021-10-11 PROCEDURE — 74011000250 HC RX REV CODE- 250: Performed by: ANESTHESIOLOGY

## 2021-10-11 PROCEDURE — 76010000161 HC OR TIME 1 TO 1.5 HR INTENSV-TIER 1: Performed by: ORTHOPAEDIC SURGERY

## 2021-10-11 PROCEDURE — 77030002916 HC SUT ETHLN J&J -A: Performed by: ORTHOPAEDIC SURGERY

## 2021-10-11 PROCEDURE — 76210000020 HC REC RM PH II FIRST 0.5 HR: Performed by: ORTHOPAEDIC SURGERY

## 2021-10-11 PROCEDURE — 25310 TRANSPLANT FOREARM TENDON: CPT | Performed by: ORTHOPAEDIC SURGERY

## 2021-10-11 PROCEDURE — 74011250636 HC RX REV CODE- 250/636: Performed by: NURSE ANESTHETIST, CERTIFIED REGISTERED

## 2021-10-11 PROCEDURE — 76210000063 HC OR PH I REC FIRST 0.5 HR: Performed by: ORTHOPAEDIC SURGERY

## 2021-10-11 PROCEDURE — 74011250636 HC RX REV CODE- 250/636: Performed by: NURSE PRACTITIONER

## 2021-10-11 PROCEDURE — 26516 FUSION OF KNUCKLE JOINT: CPT | Performed by: ORTHOPAEDIC SURGERY

## 2021-10-11 PROCEDURE — 77030002922 HC SUT FBRWRE ARTH -B: Performed by: ORTHOPAEDIC SURGERY

## 2021-10-11 PROCEDURE — 74011250636 HC RX REV CODE- 250/636: Performed by: ANESTHESIOLOGY

## 2021-10-11 PROCEDURE — 77030006689 HC BLD OPHTH BVR BD -A: Performed by: ORTHOPAEDIC SURGERY

## 2021-10-11 PROCEDURE — 77030006788 HC BLD SAW OSC STRY -B: Performed by: ORTHOPAEDIC SURGERY

## 2021-10-11 DEVICE — Z DISCONTINUED NO SUB IDED SET ORTH L8.5MM DIA3.5MM CANN DRL BIT GWIRE TAP FIBERLOOP: Type: IMPLANTABLE DEVICE | Site: FINGER | Status: FUNCTIONAL

## 2021-10-11 RX ORDER — HYDROMORPHONE HYDROCHLORIDE 1 MG/ML
0.5 INJECTION, SOLUTION INTRAMUSCULAR; INTRAVENOUS; SUBCUTANEOUS
Status: DISCONTINUED | OUTPATIENT
Start: 2021-10-11 | End: 2021-10-11 | Stop reason: HOSPADM

## 2021-10-11 RX ORDER — SODIUM CHLORIDE 0.9 % (FLUSH) 0.9 %
5-40 SYRINGE (ML) INJECTION AS NEEDED
Status: DISCONTINUED | OUTPATIENT
Start: 2021-10-11 | End: 2021-10-11 | Stop reason: HOSPADM

## 2021-10-11 RX ORDER — DIPHENHYDRAMINE HYDROCHLORIDE 50 MG/ML
12.5 INJECTION, SOLUTION INTRAMUSCULAR; INTRAVENOUS
Status: DISCONTINUED | OUTPATIENT
Start: 2021-10-11 | End: 2021-10-11 | Stop reason: HOSPADM

## 2021-10-11 RX ORDER — MIDAZOLAM HYDROCHLORIDE 1 MG/ML
2 INJECTION, SOLUTION INTRAMUSCULAR; INTRAVENOUS
Status: DISCONTINUED | OUTPATIENT
Start: 2021-10-11 | End: 2021-10-11 | Stop reason: HOSPADM

## 2021-10-11 RX ORDER — OXYCODONE AND ACETAMINOPHEN 5; 325 MG/1; MG/1
1 TABLET ORAL AS NEEDED
Status: DISCONTINUED | OUTPATIENT
Start: 2021-10-11 | End: 2021-10-11 | Stop reason: HOSPADM

## 2021-10-11 RX ORDER — FENTANYL CITRATE 50 UG/ML
25 INJECTION, SOLUTION INTRAMUSCULAR; INTRAVENOUS ONCE
Status: COMPLETED | OUTPATIENT
Start: 2021-10-11 | End: 2021-10-11

## 2021-10-11 RX ORDER — PROPOFOL 10 MG/ML
INJECTION, EMULSION INTRAVENOUS AS NEEDED
Status: DISCONTINUED | OUTPATIENT
Start: 2021-10-11 | End: 2021-10-11 | Stop reason: HOSPADM

## 2021-10-11 RX ORDER — FAMOTIDINE 20 MG/1
20 TABLET, FILM COATED ORAL ONCE
Status: DISCONTINUED | OUTPATIENT
Start: 2021-10-11 | End: 2021-10-11 | Stop reason: HOSPADM

## 2021-10-11 RX ORDER — MIDAZOLAM HYDROCHLORIDE 1 MG/ML
2 INJECTION, SOLUTION INTRAMUSCULAR; INTRAVENOUS ONCE
Status: COMPLETED | OUTPATIENT
Start: 2021-10-11 | End: 2021-10-11

## 2021-10-11 RX ORDER — SODIUM CHLORIDE 9 MG/ML
50 INJECTION, SOLUTION INTRAVENOUS CONTINUOUS
Status: DISCONTINUED | OUTPATIENT
Start: 2021-10-11 | End: 2021-10-11 | Stop reason: HOSPADM

## 2021-10-11 RX ORDER — MIDAZOLAM HYDROCHLORIDE 1 MG/ML
INJECTION, SOLUTION INTRAMUSCULAR; INTRAVENOUS AS NEEDED
Status: DISCONTINUED | OUTPATIENT
Start: 2021-10-11 | End: 2021-10-11 | Stop reason: HOSPADM

## 2021-10-11 RX ORDER — SODIUM CHLORIDE 0.9 % (FLUSH) 0.9 %
5-40 SYRINGE (ML) INJECTION EVERY 8 HOURS
Status: DISCONTINUED | OUTPATIENT
Start: 2021-10-11 | End: 2021-10-11 | Stop reason: HOSPADM

## 2021-10-11 RX ORDER — LIDOCAINE HYDROCHLORIDE 20 MG/ML
INJECTION, SOLUTION EPIDURAL; INFILTRATION; INTRACAUDAL; PERINEURAL AS NEEDED
Status: DISCONTINUED | OUTPATIENT
Start: 2021-10-11 | End: 2021-10-11 | Stop reason: HOSPADM

## 2021-10-11 RX ORDER — CEFAZOLIN SODIUM/WATER 2 G/20 ML
2 SYRINGE (ML) INTRAVENOUS ONCE
Status: COMPLETED | OUTPATIENT
Start: 2021-10-11 | End: 2021-10-11

## 2021-10-11 RX ORDER — BUPIVACAINE HYDROCHLORIDE AND EPINEPHRINE 2.5; 5 MG/ML; UG/ML
INJECTION, SOLUTION EPIDURAL; INFILTRATION; INTRACAUDAL; PERINEURAL
Status: COMPLETED | OUTPATIENT
Start: 2021-10-11 | End: 2021-10-11

## 2021-10-11 RX ORDER — BUPIVACAINE HYDROCHLORIDE AND EPINEPHRINE 5; 5 MG/ML; UG/ML
INJECTION, SOLUTION EPIDURAL; INTRACAUDAL; PERINEURAL
Status: COMPLETED | OUTPATIENT
Start: 2021-10-11 | End: 2021-10-11

## 2021-10-11 RX ORDER — SODIUM CHLORIDE, SODIUM LACTATE, POTASSIUM CHLORIDE, CALCIUM CHLORIDE 600; 310; 30; 20 MG/100ML; MG/100ML; MG/100ML; MG/100ML
75 INJECTION, SOLUTION INTRAVENOUS CONTINUOUS
Status: DISCONTINUED | OUTPATIENT
Start: 2021-10-11 | End: 2021-10-11 | Stop reason: HOSPADM

## 2021-10-11 RX ORDER — OXYCODONE HYDROCHLORIDE 5 MG/1
5 TABLET ORAL
Status: DISCONTINUED | OUTPATIENT
Start: 2021-10-11 | End: 2021-10-11 | Stop reason: HOSPADM

## 2021-10-11 RX ORDER — LIDOCAINE HYDROCHLORIDE 20 MG/ML
INJECTION, SOLUTION EPIDURAL; INFILTRATION; INTRACAUDAL; PERINEURAL
Status: COMPLETED | OUTPATIENT
Start: 2021-10-11 | End: 2021-10-11

## 2021-10-11 RX ORDER — LIDOCAINE HYDROCHLORIDE 10 MG/ML
0.1 INJECTION INFILTRATION; PERINEURAL AS NEEDED
Status: DISCONTINUED | OUTPATIENT
Start: 2021-10-11 | End: 2021-10-11 | Stop reason: HOSPADM

## 2021-10-11 RX ORDER — SODIUM CHLORIDE, SODIUM LACTATE, POTASSIUM CHLORIDE, CALCIUM CHLORIDE 600; 310; 30; 20 MG/100ML; MG/100ML; MG/100ML; MG/100ML
100 INJECTION, SOLUTION INTRAVENOUS CONTINUOUS
Status: DISCONTINUED | OUTPATIENT
Start: 2021-10-11 | End: 2021-10-11 | Stop reason: HOSPADM

## 2021-10-11 RX ADMIN — BUPIVACAINE HYDROCHLORIDE AND EPINEPHRINE BITARTRATE 15 ML: 2.5; .005 INJECTION, SOLUTION EPIDURAL; INFILTRATION; INTRACAUDAL; PERINEURAL at 09:33

## 2021-10-11 RX ADMIN — LIDOCAINE HYDROCHLORIDE 10 ML: 20 INJECTION, SOLUTION EPIDURAL; INFILTRATION; INTRACAUDAL; PERINEURAL at 09:33

## 2021-10-11 RX ADMIN — CEFAZOLIN 2 G: 1 INJECTION, POWDER, FOR SOLUTION INTRAVENOUS at 10:17

## 2021-10-11 RX ADMIN — PHENYLEPHRINE HYDROCHLORIDE 100 MCG: 10 INJECTION INTRAVENOUS at 10:47

## 2021-10-11 RX ADMIN — PROPOFOL 40 MG: 10 INJECTION, EMULSION INTRAVENOUS at 10:12

## 2021-10-11 RX ADMIN — MIDAZOLAM 2 MG: 1 INJECTION INTRAMUSCULAR; INTRAVENOUS at 10:11

## 2021-10-11 RX ADMIN — PHENYLEPHRINE HYDROCHLORIDE 100 MCG: 10 INJECTION INTRAVENOUS at 10:37

## 2021-10-11 RX ADMIN — BUPIVACAINE HYDROCHLORIDE AND EPINEPHRINE BITARTRATE 15 ML: 5; .005 INJECTION, SOLUTION EPIDURAL; INTRACAUDAL; PERINEURAL at 09:33

## 2021-10-11 RX ADMIN — SODIUM CHLORIDE, SODIUM LACTATE, POTASSIUM CHLORIDE, AND CALCIUM CHLORIDE 75 ML/HR: 600; 310; 30; 20 INJECTION, SOLUTION INTRAVENOUS at 09:18

## 2021-10-11 RX ADMIN — LIDOCAINE HYDROCHLORIDE 30 MG: 20 INJECTION, SOLUTION EPIDURAL; INFILTRATION; INTRACAUDAL; PERINEURAL at 10:11

## 2021-10-11 RX ADMIN — PROPOFOL 20 MG: 10 INJECTION, EMULSION INTRAVENOUS at 09:29

## 2021-10-11 RX ADMIN — PROPOFOL 20 MG: 10 INJECTION, EMULSION INTRAVENOUS at 09:27

## 2021-10-11 RX ADMIN — PROPOFOL 140 MCG/KG/MIN: 10 INJECTION, EMULSION INTRAVENOUS at 10:13

## 2021-10-11 RX ADMIN — MIDAZOLAM 2 MG: 1 INJECTION INTRAMUSCULAR; INTRAVENOUS at 09:27

## 2021-10-11 RX ADMIN — FENTANYL CITRATE 25 MCG: 50 INJECTION INTRAMUSCULAR; INTRAVENOUS at 09:27

## 2021-10-11 NOTE — ANESTHESIA PREPROCEDURE EVALUATION
Anesthetic History               Review of Systems / Medical History  Patient summary reviewed and pertinent labs reviewed    Pulmonary          Shortness of breath      Comments: covid 1/21 mild   Neuro/Psych         Psychiatric history (panic attacks)     Cardiovascular    Hypertension          Hyperlipidemia    Exercise tolerance: >4 METS     GI/Hepatic/Renal     GERD           Endo/Other        Arthritis     Other Findings            Physical Exam    Airway  Mallampati: I  TM Distance: 4 - 6 cm  Neck ROM: normal range of motion   Mouth opening: Normal     Cardiovascular    Rhythm: regular  Rate: normal         Dental  No notable dental hx       Pulmonary  Breath sounds clear to auscultation               Abdominal  GI exam deferred       Other Findings            Anesthetic Plan    ASA: 3  Anesthesia type: total IV anesthesia      Post-op pain plan if not by surgeon: peripheral nerve block single    Induction: Intravenous  Anesthetic plan and risks discussed with: Patient

## 2021-10-11 NOTE — OP NOTES
ORTHOPAEDIC SURGICAL NOTE        Caridad Tatum female 59 y.o.  [id number]   10/11/2021     PRE-OP DIAGNOSIS: Arthritis of carpometacarpal Craig) joint of left thumb [M18.12] left thumb MP joint hyperlaxity  POST-OP DIAGNOSIS: Arthritis of carpometacarpal Craig) joint of left thumb [M18.12], left thumb MP joint hyperlaxity  LATERALITY: Left     PROCEDURES PERFORMED:   Left Thumb Trapeziectomy and Arthroplasty with FCR-APL tendon interposition, left thumb A1 pulley release, left thumb MP joint capsulodesis with internal brace for correction of hyperextension (26116, 06145, 41895, 64365)       SURGEON:   Ritchie Downey MD, PhD     ANESTHESIA: Regional     STAFF:    Circ-1: Neela Bauer Tech-1: Estee Chaney     ESTIMATED BLOOD LOSS: Minimal       TOTAL IV FLUIDS : See anesthesia note    COMPLICATIONS: None     TOURNIQUET TIME:   Total Tourniquet Time Documented:  Upper Arm (Left) - 68 minutes  Total: Upper Arm (Left) - 68 minutes       INDICATION FOR PROCEDURE:     Caridad Tatum has longstanding Left thumb CMC arthritis recalcitrant to conservative measures including braces, oral and topical NSAIDs and, steroid injections. Surgical and non-surgical treatment options were discussed with the patient and their family, as well as the risk and benefits of each option. After thorough discussion, the patient decided to proceed with surgical management. Specific to this treatment plan, we discussed in detail surgical risks including scar, pain, bleeding, infection, anesthetic risks, neurovascular injury, failure to achieve desired results, hardware problems, need for further surgery,  weakness, stiffness, risk of death and potential risk of other unforseen complication. The Patient consented to the procedure after discussion of the risks and benefits. DESCRIPTION OF PROCEDURE:     The patient was identified in the holding room. The Left thumb was marked and confirmed as the correct operative site. They were then brought to the OR and general anesthesia was induced. They were transferred onto the OR table in the supine position. All bony prominences were well padded. SCDs were placed on the bilateral legs throughout the case. A timeout was performed, verifying the correct patient, the correct side being the Left side and the correct procedure. Antibiotics were then administered, and were redosed during the procedure as needed at indicated intervals. A non-sterile tourniquet was placed on the Left arm. The Left upper extremity was pre scrubbed and then prepped and draped in routine sterile fashion. An incisional timeout was performed re-confirming the correct patient, surgical site and procedure, as well as verifying antibiotics. A Mcdonald incision mas made over the Left 1st CMC joint, the radial sensory nerve was identified and protected, the interval between the APL and Thenar Muscles was identified and used to approach the [facility] joint. The radial artery was identified, articular branches cauterized, the artery was mobilized and protected. The [facility] joint was incised longitudinally, the trapezium was sharply dissected with Roosevelt retractors and knife. The trapezium was cut with an oscillating saw in 4 quadrants and finished with osteotomes. The fragments were retrieved with Rongeur until no bony fragments were present. The Scapho-Trapezoid joint was inspected and in good shape. Using a #0 fiberwire, a suspension was made between the insertion of the APL tendon to the insertion of the FCR tendon while maintaining the thumb abducted and with partial traction. After suspension-interposition the thumb was in good position and the arthroplasty space was well maintained. The capsule was imbricated and closed with 3-0 PDS.     A Brunner incision was made on the volar aspect of the left thumb, blunt dissection was carried down to identify the neurovascular bundles, these were carefully retracted, the A1 pulley was incised, the FPL was retracted, the volar plate was detached proximally, a K wire was inserted at the metacarpal neck, placement was confirmed under fluoroscopy, an Arthrex corkscrew was loaded with fiber tape and 3-0 FiberWire, this was inserted into the predrilled hole at the metacarpal neck, the 3-0 FiberWire was used to repair the volar plate while keeping the MP joint in slight flexion, a second corkscrew was applied to the base of the thumb proximal phalanx, this was done in the same fashion, a K wire was inserted, position was confirmed under fluoroscopy and the fiber tape was used as an internal brace while keeping the MP joint in slight flexion. Final fluoroscopy demonstrated good position of the MP joint, stress radiograph demonstrated no hyperextension of the MP joint. The plantar incision was closed with 4 interrupted Prolene sutures. The [facility] Wound was irrigated, tourniquet released and hemostasis was obtained with bipolar cautery. The wound was closed in layers with 4-0 vicryl, 3-0 prolene and thumb splint applied. Disposition: To PACU with no complications and follow up per routine. Patient is instructed to keep splint on and avoid lifting with the left hand.        Angella Pineda MD    10/11/21  5:26 PM

## 2021-10-11 NOTE — ANESTHESIA PROCEDURE NOTES
Peripheral Block    Start time: 10/11/2021 9:27 AM  End time: 10/11/2021 9:33 AM  Performed by: Ade Mensah MD  Authorized by: Ade Mensah MD       Pre-procedure:    Indications: at surgeon's request and post-op pain management    Preanesthetic Checklist: patient identified, risks and benefits discussed, site marked, timeout performed, anesthesia consent given and patient being monitored    Timeout Time: 09:27 EDT          Block Type:   Block Type:  Axillary  Laterality:  Left  Monitoring:  Standard ASA monitoring, continuous pulse ox, frequent vital sign checks, heart rate, oxygen and responsive to questions  Injection Technique:  Single shot  Procedures: ultrasound guided and nerve stimulator    Patient Position: supine (carefully positioned due to vertigo hx; not completely supine)  Prep: chlorhexidine    Location:  Axilla  Needle Type:  Stimuplex  Needle Gauge:  22 G  Needle Localization:  Nerve stimulator and ultrasound guidance  Motor Response comment:   Motor Response: minimal motor response >0.4 mA   Medication Injected:  Bupivacaine 0.25% -EPINEPHrine 1:200,000 (SENSORCAINE) mg injection, 15 mL  bupivacaine-EPINEPHrine (PF)(SENSORCAINE) 0.5%-1:200,000 mg injection, 15 mL  lidocaine (XYLOCAINE) Preserv-Free 2% injection, 10 mL  Med Admin Time: 10/11/2021 9:33 AM    Assessment:  Number of attempts:  1  Injection Assessment:  Local visualized surrounding nerve on ultrasound, negative aspiration for blood, no paresthesia, no intravascular symptoms, ultrasound image on chart and incremental injection every 5 mL  Patient tolerance:  Patient tolerated the procedure well with no immediate complications

## 2021-10-11 NOTE — DISCHARGE INSTRUCTIONS
Postoperative  Instructions:      Weightbearing or Lifting: You  are  not  allowed  to  lift  any  weight  or  bear  any  weight  on  the  surgical  extremity  until  cleared  by  your  surgeon. Dressing  instructions:    Keep  your  dressing  and/or  splint  clean  and  dry  at  all  times. It  will  be  removed  at  your  first  post-operative  appointment or therapy apppointment. Your  stitches  will  be  removed  at  this  visit. Showering  Instructions:  May  shower  But keep surgical dressing clean and dry until removed as explained above. Pain  Control:  - You  have  been  given  a  prescription  to  be  taken  as  directed  for  post-operative  pain  control. In  addition,  elevate  the  operative  extremity  above  the  heart  at  all  times  to  prevent  swelling  and  throbbing  pain. - If you develop constipation while taking narcotic pain medications (Norco, Hydrocodone, Percocet, Oxycodone, Dilaudid, Hydromorphone) take  over-the-counter  Colace,  100mg  by  mouth  twice  a  Day. - Nausea  is  a  common  side  effect  of  many  pain  medications. You  will  want  to  eat something  before  taking  your  pain  medicine  to  help  prevent  Nausea. - If  you  are  taking  a  prescription  pain  medication  that  contains  acetaminophen,  we  recommend  that  you  do  not  take  additional  over  the  counter  acetaminophen  (Tylenol®). Other  pain  relieving  options:   - Using  a  cold  pack  to  ice  the  affected  area  a  few  times  a  day  (15  to  20  minutes  at  a  time)  can  help  to  relieve  pain,  reduce  swelling  and  bruising.      - Elevation  of  the  affected  area  can  also  help  to  reduce  pain  and  swelling. Did  you  receive  a  nerve  Block? A  nerve  block  can  provide  pain  relief  for  one  hour  to  two  days  after  your  surgery.   As  long  as  the  nerve  block  is  working,  you  will  experience  little  or  no  sensation in  the  area  the  surgeon  operated  on. As  the  nerve  block  wears  off,  you  will  begin  to  experience  pain  or  discomfort. It  is  very  important  that  you  begin  taking  your  prescribed  pain  medication  before  the  nerve  block  fully  wears  off. The first sign that the nerve block is wearing off is tingling in your fingers. Treating  your  pain  at  the  first  sign  of  the  block  wearing  off  will  ensure  your  pain  is  better  controlled  and  more  tolerable  when  full-sensation  returns. Do  not  wait  until  the  pain  is  intolerable,  as  the  medicine  will  be  less  effective. It  is  better  to  treat  pain  in  advance  than  to  try  and  catch  up. General  Anesthesia or Sedation:      If  you  did  not  receive  a  nerve  block  during  your  surgery,  you  will  need  to  start  taking  your  pain  medication  shortly  after  your  surgery  and  should  continue  to  do  so  as  prescribed  by  your  surgeon. Please  call  [phone]  with any concern and ask to speak with Karina Gage. Concerning problems include:      -  Excessive  redness  of  the  incisions      -  Drainage  for  more  than  2  Days after surgery or any foul smelling drainage  -  Fever  of  more  than  101.5  F      Please  call  [phone]  if  you  do  not  receive  or  are  unsure  of  your  first  follow-up  appointment. You  should  see  the  doctor  10-14  days  after  your  Surgery. Thank you for choosing me and [address] [place] [address] for your care. I will go above and beyond to ensure you receive the best care possible. Reynold Gallegos MD, PhD      MEDICATION INTERACTION:  During your procedure you potentially received a medication or medications which may reduce the effectiveness of oral contraceptives.  Please consider other forms of contraception for 1 month following your procedure if you are currently using oral contraceptives as your primary form of birth control. In addition to this, we recommend continuing your oral contraceptive as prescribed, unless otherwise instructed by your physician, during this time    After general anesthesia or intravenous sedation, for 24 hours or while taking prescription Narcotics:  · Limit your activities  · A responsible adult needs to be with you for the next 24 hours  · Do not drive and operate hazardous machinery  · Do not make important personal or business decisions  · Do not drink alcoholic beverages  · If you have not urinated within 8 hours after discharge, and you are experiencing discomfort from urinary retention, please go to the nearest ED. · If you have sleep apnea and have a CPAP machine, please use it for all naps and sleeping. · Please use caution when taking narcotics and any of your home medications that may cause drowsiness. *  Please give a list of your current medications to your Primary Care Provider. *  Please update this list whenever your medications are discontinued, doses are      changed, or new medications (including over-the-counter products) are added. *  Please carry medication information at all times in case of emergency situations. These are general instructions for a healthy lifestyle:  No smoking/ No tobacco products/ Avoid exposure to second hand smoke  Surgeon General's Warning:  Quitting smoking now greatly reduces serious risk to your health. Obesity, smoking, and sedentary lifestyle greatly increases your risk for illness  A healthy diet, regular physical exercise & weight monitoring are important for maintaining a healthy lifestyle    You may be retaining fluid if you have a history of heart failure or if you experience any of the following symptoms:  Weight gain of 3 pounds or more overnight or 5 pounds in a week, increased swelling in our hands or feet or shortness of breath while lying flat in bed.   Please call your doctor as soon as you notice any of these symptoms; do not wait until your next office visit.

## 2021-10-12 NOTE — ANESTHESIA POSTPROCEDURE EVALUATION
Procedure(s):  LEFT THUMB MP JOINT CAPSULODESIS WITH INTERNAL BRACE AT THE SAME TIME OF THE LEFT THUMB  TRAPEZIECTOMY AND SUSPENSION  ARTHROPLASTY  / PLEXUS.    total IV anesthesia    Anesthesia Post Evaluation      Multimodal analgesia: multimodal analgesia used between 6 hours prior to anesthesia start to PACU discharge  Patient location during evaluation: bedside  Patient participation: complete - patient participated  Level of consciousness: awake  Pain management: adequate  Airway patency: patent  Anesthetic complications: no  Cardiovascular status: acceptable and stable  Respiratory status: acceptable and room air  Hydration status: acceptable        INITIAL Post-op Vital signs:   Vitals Value Taken Time   /69 10/11/21 1205   Temp 36.6 °C (97.8 °F) 10/11/21 1139   Pulse 64 10/11/21 1206   Resp 16 10/11/21 1205   SpO2 94 % 10/11/21 1206   Vitals shown include unvalidated device data.

## 2021-10-12 NOTE — INTERVAL H&P NOTE
H&P Update:  Guillermo Iniguez was seen and examined. History and physical has been reviewed. The patient has been examined.  There have been no significant clinical changes since the completion of the originally dated History and Physical.    Elliot Borrero MD  Orthopaedic Surgery  10/12/21  8:16 AM

## 2021-12-10 PROBLEM — M65.342 TRIGGER FINGER, LEFT RING FINGER: Status: ACTIVE | Noted: 2021-12-10

## 2022-03-18 PROBLEM — M65.342 TRIGGER FINGER, LEFT RING FINGER: Status: ACTIVE | Noted: 2021-12-10

## 2022-03-18 PROBLEM — M18.12 ARTHRITIS OF CARPOMETACARPAL (CMC) JOINT OF LEFT THUMB: Status: ACTIVE | Noted: 2021-10-10

## 2022-03-19 PROBLEM — M25.342 CHRONIC INSTABILITY OF METACARPOPHALANGEAL JOINT OF LEFT THUMB: Status: ACTIVE | Noted: 2021-10-10

## 2022-07-07 DIAGNOSIS — M79.645 THUMB PAIN, LEFT: Primary | ICD-10-CM

## 2022-07-08 ENCOUNTER — OFFICE VISIT (OUTPATIENT)
Dept: ORTHOPEDIC SURGERY | Age: 62
End: 2022-07-08
Payer: COMMERCIAL

## 2022-07-08 DIAGNOSIS — M18.12 ARTHRITIS OF CARPOMETACARPAL (CMC) JOINT OF LEFT THUMB: Primary | ICD-10-CM

## 2022-07-08 DIAGNOSIS — M65.342 TRIGGER FINGER, LEFT RING FINGER: ICD-10-CM

## 2022-07-08 PROCEDURE — 99214 OFFICE O/P EST MOD 30 MIN: CPT | Performed by: ORTHOPAEDIC SURGERY

## 2022-07-08 PROCEDURE — 20550 NJX 1 TENDON SHEATH/LIGAMENT: CPT | Performed by: ORTHOPAEDIC SURGERY

## 2022-07-08 RX ORDER — BETAMETHASONE SODIUM PHOSPHATE AND BETAMETHASONE ACETATE 3; 3 MG/ML; MG/ML
6 INJECTION, SUSPENSION INTRA-ARTICULAR; INTRALESIONAL; INTRAMUSCULAR; SOFT TISSUE ONCE
Status: COMPLETED | OUTPATIENT
Start: 2022-07-08 | End: 2022-07-08

## 2022-07-08 RX ORDER — MELOXICAM 15 MG/1
15 TABLET ORAL DAILY
Qty: 30 TABLET | Refills: 0 | Status: SHIPPED | OUTPATIENT
Start: 2022-07-08 | End: 2022-08-07

## 2022-07-08 RX ADMIN — BETAMETHASONE SODIUM PHOSPHATE AND BETAMETHASONE ACETATE 6 MG: 3; 3 INJECTION, SUSPENSION INTRA-ARTICULAR; INTRALESIONAL; INTRAMUSCULAR; SOFT TISSUE at 10:15

## 2022-07-08 NOTE — PROGRESS NOTES
Orthopaedic Hand Clinic Note    Name: Kiki Thomas  Age: 57 y.o. YOB: 1960  Gender: female  MRN: [id number]      Follow up visit:   1. Arthritis of carpometacarpal (CMC) joint of left thumb    2. Trigger finger, left ring finger        HPI: Kiki Thomas is a 57 y.o. female who is following up for left thumb CMC arthritis, she is status post left thumb trapeziectomy and suspension arthroplasty as well as thumb MCP joint capsulodesis with internal brace, she says she was 100% better up until about 4 weeks ago when she had an injury lifting her grandkid and she thinks the thumb hyperextended, this has improved over the past month but she wanted to make sure that she did not cause any damage, she continues to complain of left ring triggering which is painful. ROS/Meds/PSH/PMH/FH/SH: I personally reviewed the patients standard intake form. Pertinents are discussed in the HPI    Physical Examination:  General: Awake and alert. HEENT: Normocephalic, atraumatic  CV/Pulm: Breathing even and unlabored  Skin: No obvious rashes noted. Lymphatic: No obvious evidence of lymphedema or lymphadenopathy    Musculoskeletal Examination:  Examination on the left upper extremity demonstrates cap refill < 5 seconds in all fingers, good thumb posture, she is able to touch the base of the small finger, some tenderness palpation at the [facility] joint, left thumb MCP motion 0 to 45 degrees, no pain there. Tenderness palpation of the left ring finger A1 pulley with palpable clicking and locking. Imaging / Electrodiagnostic Tests:     left thumb XR: AP, Lateral, Oblique views     Clinical Indication  1. Arthritis of carpometacarpal (CMC) joint of left thumb    2.  Trigger finger, left ring finger           Report: AP, Lateral, Oblique XR demonstrates well-maintained arthroplasty space without subluxation, arthritic changes noted at the scaphotrapezoid joint but these changes are mild    Impression: as above     [employer] Israel Reed MD         Assessment:   1. Arthritis of carpometacarpal (CMC) joint of left thumb    2. Trigger finger, left ring finger        Plan:   We discussed the diagnosis and different treatment options. We discussed observation, therapy, antiinflammatory medications and other pertinent treatment modalities. After discussing in detail the patient elects to proceed with Comfort Cool brace for the next 4 weeks, Mobic 15 daily for 4 weeks, I do not believe she caused any damage, she may have sprained the [facility] joint space, if in 4 weeks she is unimproved she will return and we will try steroid injection. I also performed a left ring finger A1 pulley steroid injection today    Procedure Note    The risk, benefits and alternatives of injection and no injection therapy were discussed. The patient consented for an injection. The patient has been identified by name and birthdate. The injection site was identified, marked and prepped with a alcohol swab. Time out completed. The A1 pulley of the Left ring finger was/were injected with a 25 gauge needle with 1ml of 6mg/ml Betamethasone and 1ml xylocaine plain 1%. The injection site was then dressed with a bandaid. The patient tolerated the injection well. The patient was instructed to monitor their blood sugars if diabetic and call if any concerns. The patient was instructed to call the office if any adverse local effects occurred or any if any questions or concerns arise. .     Patient voiced accordance and understanding of the information provided and the formulated plan. All questions were answered to the patient's satisfaction during the encounter.     Jolly Dao MD  Orthopaedic Surgery  07/08/22  11:53 AM

## 2023-04-19 ENCOUNTER — OFFICE VISIT (OUTPATIENT)
Dept: ORTHOPEDIC SURGERY | Age: 63
End: 2023-04-19
Payer: COMMERCIAL

## 2023-04-19 DIAGNOSIS — M65.342 TRIGGER FINGER, LEFT RING FINGER: ICD-10-CM

## 2023-04-19 DIAGNOSIS — M79.642 LEFT HAND PAIN: Primary | ICD-10-CM

## 2023-04-19 DIAGNOSIS — M19.032 ARTHRITIS OF SCAPHOID-TRAPEZIUM-TRAPEZOID JOINT OF LEFT HAND: ICD-10-CM

## 2023-04-19 DIAGNOSIS — M18.12 ARTHRITIS OF CARPOMETACARPAL (CMC) JOINT OF LEFT THUMB: ICD-10-CM

## 2023-04-19 PROCEDURE — 99214 OFFICE O/P EST MOD 30 MIN: CPT | Performed by: ORTHOPAEDIC SURGERY

## 2023-04-19 RX ORDER — METHYLPREDNISOLONE 4 MG/1
TABLET ORAL
Qty: 1 KIT | Refills: 0 | Status: SHIPPED | OUTPATIENT
Start: 2023-04-19

## 2023-04-19 RX ORDER — MELOXICAM 15 MG/1
15 TABLET ORAL DAILY
Qty: 30 TABLET | Refills: 1 | Status: SHIPPED | OUTPATIENT
Start: 2023-04-19 | End: 2023-05-19

## 2023-04-19 NOTE — PROGRESS NOTES
Orthopaedic Hand Clinic Note    Name: Rhett Schaffer  Age: 57 y.o. YOB: 1960  Gender: female  MRN: [id number]      Follow up visit:   1. Left hand pain    2. Trigger finger, left ring finger    3. Arthritis of carpometacarpal (CMC) joint of left thumb    4. Arthritis of utwgmnsp-fiwlhqfsq-vlrcsellf joint of left hand        HPI: Rhett Schaffer is a 57 y.o. female who is following up for left hand pain, she is status post left thumb trapeziectomy and suspension arthroplasty over a year ago and she did very well with that, she had a fall while in [place] and sustained a minimally displaced left distal radius fracture, this happened 4 months ago, she was to ensure that the fracture healed well, she is also having some swelling and pain on the dorsal radial aspect of the wrist.      ROS/Meds/PSH/PMH/FH/SH: I personally reviewed the patients standard intake form. Pertinents are discussed in the HPI    Physical Examination:  General: Awake and alert. HEENT: Normocephalic, atraumatic  CV/Pulm: Breathing even and unlabored  Skin: No obvious rashes noted. Lymphatic: No obvious evidence of lymphedema or lymphadenopathy    Musculoskeletal Examination:  Examination on the left upper extremity demonstrates cap refill < 5 seconds in all fingers, mild swelling on the dorsal radial aspect of the wrist, no tense palpation of the CMC joint, tenderness ovation of the STT joint, no tender ovation of the distal radius, some discomfort with scaphoid shift test but not as severe as tenderness at the STT joint. Imaging / Electrodiagnostic Tests:     left Wrist XR: AP, Lateral, Oblique views     Clinical Indication:  1. Left hand pain    2. Trigger finger, left ring finger    3. Arthritis of carpometacarpal (CMC) joint of left thumb    4.  Arthritis of lzrdaacy-fceyexkrd-ydgtfegrw joint of left hand           Report: AP, lateral, oblique x-ray wrist XRs demonstrates  trapeziectomy and suspension arthroplasty, some

## 2023-05-10 RX ORDER — ASCORBIC ACID 250 MG
TABLET ORAL DAILY
COMMUNITY

## 2023-05-10 RX ORDER — GABAPENTIN 100 MG/1
CAPSULE ORAL PRN
COMMUNITY

## 2023-05-10 RX ORDER — CYCLOBENZAPRINE HCL 10 MG
TABLET ORAL 3 TIMES DAILY PRN
COMMUNITY

## 2023-05-10 RX ORDER — ERGOCALCIFEROL 1.25 MG/1
50000 CAPSULE ORAL DAILY
COMMUNITY

## 2023-05-10 RX ORDER — ALBUTEROL SULFATE 90 UG/1
AEROSOL, METERED RESPIRATORY (INHALATION)
COMMUNITY

## 2023-05-10 RX ORDER — ONDANSETRON 4 MG/1
4 TABLET, ORALLY DISINTEGRATING ORAL EVERY 8 HOURS PRN
COMMUNITY
Start: 2021-10-11

## 2025-05-05 ENCOUNTER — OFFICE VISIT (OUTPATIENT)
Dept: ORTHOPEDIC SURGERY | Age: 65
End: 2025-05-05
Payer: MEDICARE

## 2025-05-05 VITALS — WEIGHT: 145 LBS | BODY MASS INDEX: 27.38 KG/M2 | HEIGHT: 61 IN

## 2025-05-05 DIAGNOSIS — G89.29 CHRONIC BILATERAL LOW BACK PAIN, UNSPECIFIED WHETHER SCIATICA PRESENT: ICD-10-CM

## 2025-05-05 DIAGNOSIS — M94.261 CHONDROMALACIA, KNEE, RIGHT: ICD-10-CM

## 2025-05-05 DIAGNOSIS — M17.11 PRIMARY OSTEOARTHRITIS OF RIGHT KNEE: Primary | ICD-10-CM

## 2025-05-05 DIAGNOSIS — M54.50 CHRONIC BILATERAL LOW BACK PAIN, UNSPECIFIED WHETHER SCIATICA PRESENT: ICD-10-CM

## 2025-05-05 DIAGNOSIS — M25.561 RIGHT KNEE PAIN, UNSPECIFIED CHRONICITY: ICD-10-CM

## 2025-05-05 PROCEDURE — 99214 OFFICE O/P EST MOD 30 MIN: CPT | Performed by: ORTHOPAEDIC SURGERY

## 2025-05-05 PROCEDURE — 20610 DRAIN/INJ JOINT/BURSA W/O US: CPT | Performed by: ORTHOPAEDIC SURGERY

## 2025-05-05 PROCEDURE — 4004F PT TOBACCO SCREEN RCVD TLK: CPT | Performed by: ORTHOPAEDIC SURGERY

## 2025-05-05 PROCEDURE — G8428 CUR MEDS NOT DOCUMENT: HCPCS | Performed by: ORTHOPAEDIC SURGERY

## 2025-05-05 PROCEDURE — G8399 PT W/DXA RESULTS DOCUMENT: HCPCS | Performed by: ORTHOPAEDIC SURGERY

## 2025-05-05 PROCEDURE — G8419 CALC BMI OUT NRM PARAM NOF/U: HCPCS | Performed by: ORTHOPAEDIC SURGERY

## 2025-05-05 PROCEDURE — 3017F COLORECTAL CA SCREEN DOC REV: CPT | Performed by: ORTHOPAEDIC SURGERY

## 2025-05-05 PROCEDURE — 1123F ACP DISCUSS/DSCN MKR DOCD: CPT | Performed by: ORTHOPAEDIC SURGERY

## 2025-05-05 PROCEDURE — 1090F PRES/ABSN URINE INCON ASSESS: CPT | Performed by: ORTHOPAEDIC SURGERY

## 2025-05-05 RX ORDER — METHYLPREDNISOLONE ACETATE 40 MG/ML
40 INJECTION, SUSPENSION INTRA-ARTICULAR; INTRALESIONAL; INTRAMUSCULAR; SOFT TISSUE ONCE
Status: COMPLETED | OUTPATIENT
Start: 2025-05-05 | End: 2025-05-05

## 2025-05-05 RX ORDER — ALPRAZOLAM 1 MG/1
TABLET ORAL
Qty: 1 TABLET | Refills: 0 | Status: SHIPPED | OUTPATIENT
Start: 2025-05-05 | End: 2025-05-06

## 2025-05-05 RX ADMIN — METHYLPREDNISOLONE ACETATE 40 MG: 40 INJECTION, SUSPENSION INTRA-ARTICULAR; INTRALESIONAL; INTRAMUSCULAR; SOFT TISSUE at 08:59

## 2025-05-05 NOTE — PROGRESS NOTES
tablet by mouth daily, Disp: 30 tablet, Rfl: 1    methylPREDNISolone (MEDROL DOSEPACK) 4 MG tablet, Follow package instructions, Disp: 1 kit, Rfl: 0    meloxicam (MOBIC) 15 MG tablet, Take 1 tablet by mouth daily, Disp: 30 tablet, Rfl: 0  Allergies   Allergen Reactions    Cephalexin Hives and Swelling    Nizatidine Hives    Sulfa Antibiotics Hives    Erythromycin Hives    Rosuvastatin Itching    Shellfish Allergy Itching    Adhesive Tape Rash       CC:right knee pain    Impression: Osteoarthritis of the right knee    Procedure: Depomedrol injection     Procedure Note: The right knee was prepped with alcohol. Then the right knee was injected with 1 mL of 0.5% Marcaine and 40mg of depomedrol The patient tolerated the procedure without difficulty.      RENETTA MCKEON MD  Elements of this note have been dictated using speech recognition software. As a result, errors of speech recognition may have occurred.

## 2025-06-09 ENCOUNTER — OFFICE VISIT (OUTPATIENT)
Dept: ORTHOPEDIC SURGERY | Age: 65
End: 2025-06-09
Payer: MEDICARE

## 2025-06-09 DIAGNOSIS — M17.11 PRIMARY OSTEOARTHRITIS OF RIGHT KNEE: Primary | ICD-10-CM

## 2025-06-09 PROCEDURE — 1123F ACP DISCUSS/DSCN MKR DOCD: CPT | Performed by: ORTHOPAEDIC SURGERY

## 2025-06-09 PROCEDURE — 4004F PT TOBACCO SCREEN RCVD TLK: CPT | Performed by: ORTHOPAEDIC SURGERY

## 2025-06-09 PROCEDURE — 1090F PRES/ABSN URINE INCON ASSESS: CPT | Performed by: ORTHOPAEDIC SURGERY

## 2025-06-09 PROCEDURE — G8399 PT W/DXA RESULTS DOCUMENT: HCPCS | Performed by: ORTHOPAEDIC SURGERY

## 2025-06-09 PROCEDURE — 3017F COLORECTAL CA SCREEN DOC REV: CPT | Performed by: ORTHOPAEDIC SURGERY

## 2025-06-09 PROCEDURE — G8419 CALC BMI OUT NRM PARAM NOF/U: HCPCS | Performed by: ORTHOPAEDIC SURGERY

## 2025-06-09 PROCEDURE — G8428 CUR MEDS NOT DOCUMENT: HCPCS | Performed by: ORTHOPAEDIC SURGERY

## 2025-06-09 PROCEDURE — 99214 OFFICE O/P EST MOD 30 MIN: CPT | Performed by: ORTHOPAEDIC SURGERY

## 2025-07-14 ENCOUNTER — OFFICE VISIT (OUTPATIENT)
Dept: ORTHOPEDIC SURGERY | Age: 65
End: 2025-07-14
Payer: MEDICARE

## 2025-07-14 DIAGNOSIS — M17.11 PRIMARY OSTEOARTHRITIS OF RIGHT KNEE: Primary | ICD-10-CM

## 2025-07-14 PROCEDURE — 20611 DRAIN/INJ JOINT/BURSA W/US: CPT | Performed by: PHYSICIAN ASSISTANT

## 2025-07-14 NOTE — PROGRESS NOTES
Name: Diana Loomis  YOB: 1960  Gender: female  MRN: [id number]     CC: RIGHT Knee Osteoarthritis      PROCEDURE: #1 of 3 Hyaluronic Injection    The patient's name and date of birth were confirmed prior to the injection.  The injection site was also confirmed with the patient prior to the procedure. After discussion of risks and benefits including but not limited to pain, infection, skin discoloration, and injury to blood vessels or nerves the patient verbally consented to proceed with injection of the RIGHT.  The patient is to restrict their activity for 48 hours.    Radiology Report: US guidance was used to examine the joint, ensure adequate needle placement and to decrease the risk of joint aggravation. The intracondylar notch, retropatellar fat pad, patella tendon, patella and tibia were all visualized. Pre and post injection US still images were obtained and placed in the record. Image were obtained with a [employer] ultrasound transducer (model [id number]).    Procedure Note: After steriley prepping the RIGHT knee, it was injected with a 2mL of Euflexxa and the medication was observed going into the intra-articular space via US guidance.    The patient tolerated the procedure without difficulty.    HENNY Pierce

## 2025-07-21 ENCOUNTER — OFFICE VISIT (OUTPATIENT)
Dept: ORTHOPEDIC SURGERY | Age: 65
End: 2025-07-21
Payer: MEDICARE

## 2025-07-21 DIAGNOSIS — M17.11 PRIMARY OSTEOARTHRITIS OF RIGHT KNEE: Primary | ICD-10-CM

## 2025-07-21 PROCEDURE — 20611 DRAIN/INJ JOINT/BURSA W/US: CPT | Performed by: PHYSICIAN ASSISTANT

## 2025-07-21 NOTE — PROGRESS NOTES
Name: Diana Loomis  YOB: 1960  Gender: female  MRN: [id number]     CC: RIGHT Knee Osteoarthritis      PROCEDURE: #2 of 3 Hyaluronic Injection    The patient's name and date of birth were confirmed prior to the injection.  The injection site was also confirmed with the patient prior to the procedure. After discussion of risks and benefits including but not limited to pain, infection, skin discoloration, and injury to blood vessels or nerves the patient verbally consented to proceed with injection of the RIGHT.  The patient is to restrict their activity for 48 hours.    Radiology Report: US guidance was used to examine the joint, ensure adequate needle placement and to decrease the risk of joint aggravation. The intracondylar notch, retropatellar fat pad, patella tendon, patella and tibia were all visualized. Pre and post injection US still images were obtained and placed in the record. Image were obtained with a [employer] ultrasound transducer (model [id number]).    Procedure Note: After steriley prepping the RIGHT knee, it was injected with a 2mL of Euflexxa and the medication was observed going into the intra-articular space via US guidance.    The patient tolerated the procedure without difficulty.    HENNY Pierce

## 2025-07-28 ENCOUNTER — OFFICE VISIT (OUTPATIENT)
Dept: ORTHOPEDIC SURGERY | Age: 65
End: 2025-07-28
Payer: MEDICARE

## 2025-07-28 DIAGNOSIS — M17.11 PRIMARY OSTEOARTHRITIS OF RIGHT KNEE: Primary | ICD-10-CM

## 2025-07-28 PROCEDURE — 20611 DRAIN/INJ JOINT/BURSA W/US: CPT | Performed by: PHYSICIAN ASSISTANT

## 2025-07-28 NOTE — PROGRESS NOTES
Name: Diana Loomis  YOB: 1960  Gender: female  MRN: [id number]     CC: RIGHT Knee Osteoarthritis      PROCEDURE: #3 of 3 Hyaluronic Injection    The patient's name and date of birth were confirmed prior to the injection.  The injection site was also confirmed with the patient prior to the procedure. After discussion of risks and benefits including but not limited to pain, infection, skin discoloration, and injury to blood vessels or nerves the patient verbally consented to proceed with injection of the RIGHT.  The patient is to restrict their activity for 48 hours.    Radiology Report: US guidance was used to examine the joint, ensure adequate needle placement and to decrease the risk of joint aggravation. The intracondylar notch, retropatellar fat pad, patella tendon, patella and tibia were all visualized. Pre and post injection US still images were obtained and placed in the record. Image were obtained with a [employer] ultrasound transducer (model [id number]).    Procedure Note: After steriley prepping the RIGHT knee, it was injected with a 2mL of Euflexxa and the medication was observed going into the intra-articular space via US guidance.    The patient tolerated the procedure without difficulty.    HENNY Pierce

## (undated) DEVICE — PRECISION THIN, OFFSET (5.5 X 0.38 X 25.0MM)

## (undated) DEVICE — BLADE OPHTH 180DEG CUT SURF BLU STR SHRP DBL BVL GRINDLESS

## (undated) DEVICE — SUT CHRMC 4-0 27IN SH BRN --

## (undated) DEVICE — DRESSING,GAUZE,XEROFORM,CURAD,1"X8",ST: Brand: CURAD

## (undated) DEVICE — SOLUTION IRRIG 1000ML 09% SOD CHL USP PIC PLAS CONTAINER

## (undated) DEVICE — PREP SKN CHLRAPRP APL 26ML STR --

## (undated) DEVICE — SUTURE ETHLN SZ 4-0 L18IN NONABSORBABLE BLK L19MM PS-2 3/8 1667H

## (undated) DEVICE — TOTE STRK HAND GENERIC --

## (undated) DEVICE — PADDING CAST W2INXL4YD ST COT COHESIVE HND TEARABLE SPEC

## (undated) DEVICE — BLADE OPHTH MINI BEAV SHRP --

## (undated) DEVICE — ABDOMINAL PAD: Brand: DERMACEA

## (undated) DEVICE — BANDAGE,GAUZE,CONFORMING,2"X75",STRL,LF: Brand: MEDLINE INDUSTRIES, INC.

## (undated) DEVICE — AMD ANTIMICROBIAL GAUZE SPONGES,12 PLY USP TYPE VII, 0.2% POLYHEXAMETHYLENE BIGUANIDE HCI (PHMB): Brand: CURITY

## (undated) DEVICE — PADDING CAST COHESIVE 4 YDX3 IN HND TEARABLE COTTON SPEC 100

## (undated) DEVICE — NEEDLE HYPO 25GA L1.5IN BVL ORIENTED ECLIPSE

## (undated) DEVICE — KIT CONVENIENCE HND WRST INTERNALBRACE LIG AUG IMPL REP SYS

## (undated) DEVICE — SUT ETHLN 2 30IN LR DA BLK --

## (undated) DEVICE — SUTURE ETHBND EXCEL SZ 3-0 L30IN NONABSORBABLE GRN L26MM SH X832H

## (undated) DEVICE — DRAPE,HAND,STERILE: Brand: MEDLINE

## (undated) DEVICE — BANDAGE,ELASTIC,ESMARK,STERILE,4"X9',LF: Brand: MEDLINE

## (undated) DEVICE — STERILE HOOK LOCK LATEX FREE ELASTIC BANDAGE 2INX5YD: Brand: HOOK LOCK™

## (undated) DEVICE — DERMABOND SKIN ADH 0.7ML -- DERMABOND ADVANCED 12/BX

## (undated) DEVICE — HAND PACK: Brand: MEDLINE INDUSTRIES, INC.

## (undated) DEVICE — STERILE HOOK LOCK LATEX FREE ELASTIC BANDAGE 3INX5YD: Brand: HOOK LOCK™

## (undated) DEVICE — SUTURE NONABSORBABLE MONOFILAMENT 4-0 PS-2 18 IN BLU PROLENE 8682H

## (undated) DEVICE — SUTURE FIBERWIRE 3-0 L18IN NONABSORBABLE BLU L15MM 3/8 CIR AR722701